# Patient Record
Sex: FEMALE | Race: WHITE | NOT HISPANIC OR LATINO | Employment: UNEMPLOYED | RURAL
[De-identification: names, ages, dates, MRNs, and addresses within clinical notes are randomized per-mention and may not be internally consistent; named-entity substitution may affect disease eponyms.]

---

## 2019-01-02 ENCOUNTER — OFFICE VISIT (OUTPATIENT)
Dept: FAMILY MEDICINE CLINIC | Facility: CLINIC | Age: 24
End: 2019-01-02

## 2019-01-02 VITALS
SYSTOLIC BLOOD PRESSURE: 120 MMHG | TEMPERATURE: 97.9 F | DIASTOLIC BLOOD PRESSURE: 80 MMHG | OXYGEN SATURATION: 98 % | BODY MASS INDEX: 28.14 KG/M2 | HEART RATE: 105 BPM | HEIGHT: 69 IN | WEIGHT: 190 LBS

## 2019-01-02 DIAGNOSIS — J06.9 UPPER RESPIRATORY TRACT INFECTION, UNSPECIFIED TYPE: ICD-10-CM

## 2019-01-02 DIAGNOSIS — Z32.01 POSITIVE URINE PREGNANCY TEST: Primary | ICD-10-CM

## 2019-01-02 DIAGNOSIS — N92.6 MISSED PERIOD: ICD-10-CM

## 2019-01-02 LAB
B-HCG UR QL: POSITIVE
INTERNAL NEGATIVE CONTROL: ABNORMAL
INTERNAL POSITIVE CONTROL: ABNORMAL
Lab: ABNORMAL

## 2019-01-02 PROCEDURE — 81025 URINE PREGNANCY TEST: CPT | Performed by: NURSE PRACTITIONER

## 2019-01-02 PROCEDURE — 99202 OFFICE O/P NEW SF 15 MIN: CPT | Performed by: NURSE PRACTITIONER

## 2019-01-02 NOTE — PROGRESS NOTES
"Chivo Soto is a 23 y.o. female.     [facility] Visit    PCP: none listed    CC: \"cold, cough, runny nose, pregnancy test\"    Reports LMP: 10-27-18.  Has had positive home pregnancy tests.  Needs pregnancy test and verification of pregnancy form to try and get insurance.       URI    This is a new problem. Episode onset: x 2 days. The problem has been unchanged. There has been no fever. Associated symptoms include congestion, coughing, headaches, nausea ( mostly in AM), rhinorrhea and sneezing. Pertinent negatives include no abdominal pain, chest pain, diarrhea, dysuria, ear pain, joint pain, joint swelling, neck pain, plugged ear sensation, rash, sinus pain, sore throat, swollen glands, vomiting or wheezing. She has tried nothing for the symptoms.        The following portions of the patient's history were reviewed and updated as appropriate: allergies, current medications, past medical history, past social history, past surgical history and problem list.    Review of Systems   Constitutional: Negative for appetite change, fatigue and fever.   HENT: Positive for congestion, rhinorrhea, sinus pressure and sneezing. Negative for ear discharge, ear pain and sore throat.    Eyes: Negative for discharge and itching.   Respiratory: Positive for cough. Negative for chest tightness, shortness of breath and wheezing.    Cardiovascular: Negative for chest pain.   Gastrointestinal: Positive for nausea ( mostly in AM). Negative for abdominal pain, diarrhea and vomiting.   Genitourinary: Negative for dysuria, vaginal bleeding and vaginal discharge.   Musculoskeletal: Negative for joint pain and neck pain.   Skin: Negative for rash.   Neurological: Positive for headache. Negative for dizziness.   Hematological: Negative for adenopathy.       Objective    /80 (BP Location: Right arm, Patient Position: Sitting, Cuff Size: Adult)   Pulse 105   Temp 97.9 °F (36.6 °C) (Tympanic)   Ht 175.3 cm (69\")  "  Wt 86.2 kg (190 lb)   SpO2 98%   BMI 28.06 kg/m²     Physical Exam   Constitutional: She is oriented to person, place, and time. She appears well-developed and well-nourished. No distress.   HENT:   Head: Normocephalic and atraumatic.   Right Ear: Ear canal normal. Tympanic membrane is not erythematous. A middle ear effusion (very small) is present.   Left Ear: Ear canal normal. Tympanic membrane is not erythematous. A middle ear effusion ( very small) is present.   Nose: Congestion ( mild) present. No rhinorrhea. Right sinus exhibits no maxillary sinus tenderness and no frontal sinus tenderness. Left sinus exhibits no maxillary sinus tenderness and no frontal sinus tenderness.   Mouth/Throat: Uvula is midline, oropharynx is clear and moist and mucous membranes are normal.   Eyes: Conjunctivae are normal. Right eye exhibits no discharge. Left eye exhibits no discharge.   Neck: Neck supple.   Cardiovascular: Normal rate and regular rhythm.   Pulmonary/Chest: Effort normal and breath sounds normal. She has no wheezes. She has no rales.   +loose cough     Lymphadenopathy:     She has no cervical adenopathy.   Neurological: She is alert and oriented to person, place, and time.   Nursing note and vitals reviewed.    Recent Results (from the past 24 hour(s))   POCT pregnancy, urine    Collection Time: 01/02/19 11:19 AM   Result Value Ref Range    HCG, Urine, QL Positive (A) Negative    Lot Number [id number]     Internal Positive Control Presumptive Positive     Internal Negative Control Presumptive Negative            Assessment/Plan   Valente was seen today for cough, nasal congestion, headache and late period.    Diagnoses and all orders for this visit:    Positive urine pregnancy test    Missed period  -     POCT pregnancy, urine    Upper respiratory tract infection, unspecified type      Patient encouraged to begin daily prenatal vitamin.  Encouraged to stay well hydrated. Eat frequent, small meals to see if it  helps with nausea.    Patient information handout on First Trimester provided.   Card for Dr. Ballard provided at patient request--she will schedule her own appt.    Verification of pregnancy letter provided.     For URI symptoms: Rest, fluids, Tylenol, Cool mist humidifier, saline spray to nose, vapor rub, otc Benadryl as needed.   Call or RTC if fever, worsening sinus pressure, purulent nasal discharge develop, will add antibiotics.

## 2019-01-02 NOTE — PATIENT INSTRUCTIONS
First Trimester of Pregnancy  The first trimester of pregnancy is from week 1 until the end of week 13 (months 1 through 3). During this time, your baby will begin to develop inside you. At 6-8 weeks, the eyes and face are formed, and the heartbeat can be seen on ultrasound. At the end of 12 weeks, all the baby's organs are formed. Prenatal care is all the medical care you receive before the birth of your baby. Make sure you get good prenatal care and follow all of your doctor's instructions.  Follow these instructions at home:  Medicines  · Take over-the-counter and prescription medicines only as told by your doctor. Some medicines are safe and some medicines are not safe during pregnancy.  · Take a prenatal vitamin that contains at least 600 micrograms (mcg) of folic acid.  · If you have trouble pooping (constipation), take medicine that will make your stool soft (stool softener) if your doctor approves.  Eating and drinking  · Eat regular, healthy meals.  · Your doctor will tell you the amount of weight gain that is right for you.  · Avoid raw meat and uncooked cheese.  · If you feel sick to your stomach (nauseous) or throw up (vomit):  ? Eat 4 or 5 small meals a day instead of 3 large meals.  ? Try eating a few soda crackers.  ? Drink liquids between meals instead of during meals.  · To prevent constipation:  ? Eat foods that are high in fiber, like fresh fruits and vegetables, whole grains, and beans.  ? Drink enough fluids to keep your pee (urine) clear or pale yellow.  Activity  · Exercise only as told by your doctor. Stop exercising if you have cramps or pain in your lower belly (abdomen) or low back.  · Do not exercise if it is too hot, too humid, or if you are in a place of great height (high altitude).  · Try to avoid standing for long periods of time. Move your legs often if you must  one place for a long time.  · Avoid heavy lifting.  · Wear low-heeled shoes. Sit and stand up straight.  · You  can have sex unless your doctor tells you not to.  Relieving pain and discomfort  · Wear a good support bra if your breasts are sore.  · Take warm water baths (sitz baths) to soothe pain or discomfort caused by hemorrhoids. Use hemorrhoid cream if your doctor says it is okay.  · Rest with your legs raised if you have leg cramps or low back pain.  · If you have puffy, bulging veins (varicose veins) in your legs:  ? Wear support hose or compression stockings as told by your doctor.  ? Raise (elevate) your feet for 15 minutes, 3-4 times a day.  ? Limit salt in your food.  Prenatal care  · Schedule your prenatal visits by the twelfth week of pregnancy.  · Write down your questions. Take them to your prenatal visits.  · Keep all your prenatal visits as told by your doctor. This is important.  Safety  · Wear your seat belt at all times when driving.  · Make a list of emergency phone numbers. The list should include numbers for family, friends, the hospital, and police and fire departments.  General instructions  · Ask your doctor for a referral to a local prenatal class. Begin classes no later than at the start of month 6 of your pregnancy.  · Ask for help if you need counseling or if you need help with nutrition. Your doctor can give you advice or tell you where to go for help.  · Do not use hot tubs, steam rooms, or saunas.  · Do not douche or use tampons or scented sanitary pads.  · Do not cross your legs for long periods of time.  · Avoid all herbs and alcohol. Avoid drugs that are not approved by your doctor.  · Do not use any tobacco products, including cigarettes, chewing tobacco, and electronic cigarettes. If you need help quitting, ask your doctor. You may get counseling or other support to help you quit.  · Avoid cat litter boxes and soil used by cats. These carry germs that can cause birth defects in the baby and can cause a loss of your baby (miscarriage) or stillbirth.  · Visit your dentist. At home, brush  your teeth with a soft toothbrush. Be gentle when you floss.  Contact a doctor if:  · You are dizzy.  · You have mild cramps or pressure in your lower belly.  · You have a nagging pain in your belly area.  · You continue to feel sick to your stomach, you throw up, or you have watery poop (diarrhea).  · You have a bad smelling fluid coming from your vagina.  · You have pain when you pee (urinate).  · You have increased puffiness (swelling) in your face, hands, legs, or ankles.  Get help right away if:  · You have a fever.  · You are leaking fluid from your vagina.  · You have spotting or bleeding from your vagina.  · You have very bad belly cramping or pain.  · You gain or lose weight rapidly.  · You throw up blood. It may look like coffee grounds.  · You are around people who have British measles, fifth disease, or chickenpox.  · You have a very bad headache.  · You have shortness of breath.  · You have any kind of trauma, such as from a fall or a car accident.  Summary  · The first trimester of pregnancy is from week 1 until the end of week 13 (months 1 through 3).  · To take care of yourself and your unborn baby, you will need to eat healthy meals, take medicines only if your doctor tells you to do so, and do activities that are safe for you and your baby.  · Keep all follow-up visits as told by your doctor. This is important as your doctor will have to ensure that your baby is healthy and growing well.  This information is not intended to replace advice given to you by your health care provider. Make sure you discuss any questions you have with your health care provider.  Document Released: 06/05/2009 Document Revised: 12/26/2017 Document Reviewed: 12/26/2017  [employer] Interactive Patient Education © 2017 [employer]

## 2019-01-30 ENCOUNTER — INITIAL PRENATAL (OUTPATIENT)
Dept: OBSTETRICS AND GYNECOLOGY | Facility: CLINIC | Age: 24
End: 2019-01-30

## 2019-01-30 ENCOUNTER — APPOINTMENT (OUTPATIENT)
Dept: LAB | Facility: HOSPITAL | Age: 24
End: 2019-01-30

## 2019-01-30 VITALS — SYSTOLIC BLOOD PRESSURE: 117 MMHG | WEIGHT: 193 LBS | DIASTOLIC BLOOD PRESSURE: 86 MMHG | BODY MASS INDEX: 28.5 KG/M2

## 2019-01-30 DIAGNOSIS — Z36.89 ENCOUNTER FOR OTHER SPECIFIED ANTENATAL SCREENING: Primary | ICD-10-CM

## 2019-01-30 DIAGNOSIS — Z86.69 HISTORY OF MIGRAINE HEADACHES: ICD-10-CM

## 2019-01-30 DIAGNOSIS — O21.9 NAUSEA AND VOMITING DURING PREGNANCY PRIOR TO 22 WEEKS GESTATION: ICD-10-CM

## 2019-01-30 DIAGNOSIS — Z3A.14 14 WEEKS GESTATION OF PREGNANCY: ICD-10-CM

## 2019-01-30 DIAGNOSIS — Z36.3 ENCOUNTER FOR ANTENATAL SCREENING FOR MALFORMATION USING ULTRASOUND: ICD-10-CM

## 2019-01-30 PROCEDURE — 80307 DRUG TEST PRSMV CHEM ANLYZR: CPT | Performed by: NURSE PRACTITIONER

## 2019-01-30 PROCEDURE — 87491 CHLMYD TRACH DNA AMP PROBE: CPT | Performed by: NURSE PRACTITIONER

## 2019-01-30 PROCEDURE — 86900 BLOOD TYPING SEROLOGIC ABO: CPT | Performed by: NURSE PRACTITIONER

## 2019-01-30 PROCEDURE — 99214 OFFICE O/P EST MOD 30 MIN: CPT | Performed by: NURSE PRACTITIONER

## 2019-01-30 PROCEDURE — 81003 URINALYSIS AUTO W/O SCOPE: CPT | Performed by: NURSE PRACTITIONER

## 2019-01-30 PROCEDURE — 86850 RBC ANTIBODY SCREEN: CPT | Performed by: NURSE PRACTITIONER

## 2019-01-30 PROCEDURE — G0123 SCREEN CERV/VAG THIN LAYER: HCPCS | Performed by: NURSE PRACTITIONER

## 2019-01-30 PROCEDURE — 85025 COMPLETE CBC W/AUTO DIFF WBC: CPT | Performed by: NURSE PRACTITIONER

## 2019-01-30 PROCEDURE — 87340 HEPATITIS B SURFACE AG IA: CPT | Performed by: NURSE PRACTITIONER

## 2019-01-30 PROCEDURE — 86803 HEPATITIS C AB TEST: CPT | Performed by: NURSE PRACTITIONER

## 2019-01-30 PROCEDURE — 87591 N.GONORRHOEAE DNA AMP PROB: CPT | Performed by: NURSE PRACTITIONER

## 2019-01-30 PROCEDURE — 87661 TRICHOMONAS VAGINALIS AMPLIF: CPT | Performed by: NURSE PRACTITIONER

## 2019-01-30 PROCEDURE — 86762 RUBELLA ANTIBODY: CPT | Performed by: NURSE PRACTITIONER

## 2019-01-30 PROCEDURE — G0432 EIA HIV-1/HIV-2 SCREEN: HCPCS | Performed by: NURSE PRACTITIONER

## 2019-01-30 PROCEDURE — 86901 BLOOD TYPING SEROLOGIC RH(D): CPT | Performed by: NURSE PRACTITIONER

## 2019-01-30 RX ORDER — PNV NO.95/FERROUS FUM/FOLIC AC 28MG-0.8MG
1 TABLET ORAL DAILY
COMMUNITY
End: 2019-07-31

## 2019-01-30 RX ORDER — ONDANSETRON 8 MG/1
8 TABLET, ORALLY DISINTEGRATING ORAL EVERY 8 HOURS PRN
Qty: 20 TABLET | Refills: 3 | Status: SHIPPED | OUTPATIENT
Start: 2019-01-30 | End: 2019-07-31

## 2019-01-30 NOTE — PROGRESS NOTES
CC: NOB visit; hx reviewed and updated.    HPI: LMP was 10/23; she was having monthly periods. She has had one other pregnancy that ended at 7w1d in 2016 from SAB of unknown cause. This is her fiance's first child; he is active duty [employer] and is currently away at this time. He'll be back home the first week of March and then will be deployed for 9 months. He is a healthy, white male. She does not work at this time and has no known medical illnesses.    ROS: Reports daily nausea and vomiting, migraines about 1-2 times per week and more discharge than usual. Denies dysuria, vb, constipation or heartburn.    P/E: see NOB exam note. Bedside vscan showed a single, viable IUP. FHTs at 160s. Pap smear and gc/ct swab obtained.    A/P: 23 y.o.  @ 14w1d by exact LMP here to initiate OB care    1. NOB visit  - Encourage PNV  - SAB precautions  - NOB labs and dating sonogram ordered  - Discussed exercise and weight gain in pregnancy, NOB packet given.  Discussed what food to avoid and medications one can take OTC.  Discussed practice and the number of partners, discussed on call physician potentially being delivering physician.   - No interest in genetic testing, will revisit in 2nd trimester  - Discussed using small meals, protein, and ginger for relief of nausea; discussed medication to alleviate symptoms, prescription provided for phenergan  - RTC in 4-5 weeks for anatomy scan & OB visit.

## 2019-01-31 LAB
ABO GROUP BLD: NORMAL
AMPHET+METHAMPHET UR QL: NEGATIVE
BARBITURATES UR QL SCN: NEGATIVE
BASOPHILS # BLD AUTO: 0.01 10*3/MM3 (ref 0–0.2)
BASOPHILS NFR BLD AUTO: 0.1 % (ref 0–2)
BENZODIAZ UR QL SCN: NEGATIVE
BILIRUB UR QL STRIP: NEGATIVE
BLD GP AB SCN SERPL QL: NEGATIVE
CANNABINOIDS SERPL QL: NEGATIVE
CLARITY UR: ABNORMAL
COCAINE UR QL: NEGATIVE
COLOR UR: YELLOW
DEPRECATED RDW RBC AUTO: 38 FL (ref 36.4–46.3)
EOSINOPHIL # BLD AUTO: 0.05 10*3/MM3 (ref 0–0.7)
EOSINOPHIL NFR BLD AUTO: 0.5 % (ref 0–7)
ERYTHROCYTE [DISTWIDTH] IN BLOOD BY AUTOMATED COUNT: 12.3 % (ref 11.5–14.5)
GLUCOSE UR STRIP-MCNC: NEGATIVE MG/DL
HCT VFR BLD AUTO: 42.3 % (ref 35–45)
HGB BLD-MCNC: 14.9 G/DL (ref 12–15.5)
HGB UR QL STRIP.AUTO: NEGATIVE
IMM GRANULOCYTES # BLD AUTO: 0.02 10*3/MM3 (ref 0–0.02)
IMM GRANULOCYTES NFR BLD AUTO: 0.2 % (ref 0–0.5)
KETONES UR QL STRIP: NEGATIVE
LEUKOCYTE ESTERASE UR QL STRIP.AUTO: NEGATIVE
LYMPHOCYTES # BLD AUTO: 1.69 10*3/MM3 (ref 0.6–4.2)
LYMPHOCYTES NFR BLD AUTO: 17.7 % (ref 10–50)
Lab: NORMAL
MCH RBC QN AUTO: 30 PG (ref 26.5–34)
MCHC RBC AUTO-ENTMCNC: 35.2 G/DL (ref 31.4–36)
MCV RBC AUTO: 85.3 FL (ref 80–98)
METHADONE UR QL SCN: NEGATIVE
MONOCYTES # BLD AUTO: 0.61 10*3/MM3 (ref 0–0.9)
MONOCYTES NFR BLD AUTO: 6.4 % (ref 0–12)
NEUTROPHILS # BLD AUTO: 7.19 10*3/MM3 (ref 2–8.6)
NEUTROPHILS NFR BLD AUTO: 75.1 % (ref 37–80)
NITRITE UR QL STRIP: NEGATIVE
OPIATES UR QL: NEGATIVE
OXYCODONE UR QL SCN: NEGATIVE
PH UR STRIP.AUTO: 6.5 [PH] (ref 5–9)
PLATELET # BLD AUTO: 212 10*3/MM3 (ref 150–450)
PMV BLD AUTO: 11.9 FL (ref 8–12)
PROT UR QL STRIP: NEGATIVE
RBC # BLD AUTO: 4.96 10*6/MM3 (ref 3.77–5.16)
RH BLD: POSITIVE
SP GR UR STRIP: 1.02 (ref 1–1.03)
UROBILINOGEN UR QL STRIP: ABNORMAL
WBC NRBC COR # BLD: 9.57 10*3/MM3 (ref 3.2–9.8)

## 2019-02-01 LAB
C TRACH RRNA CVX QL NAA+PROBE: NEGATIVE
GEN CATEG CVX/VAG CYTO-IMP: NORMAL
HBV SURFACE AG SERPL QL IA: NEGATIVE
HCV AB SER DONR QL: NEGATIVE
HIV1+2 AB SER QL: NEGATIVE
LAB AP CASE REPORT: NORMAL
LAB AP GYN ADDITIONAL INFORMATION: NORMAL
N GONORRHOEA RRNA SPEC QL NAA+PROBE: NEGATIVE
PATH INTERP SPEC-IMP: NORMAL
RUBV IGG SER QL: ABNORMAL
RUBV IGG SER-ACNC: 125 IU/ML (ref 0–9.9)
STAT OF ADQ CVX/VAG CYTO-IMP: NORMAL
TRICHOMONAS VAGINALIS PCR: NEGATIVE

## 2019-02-03 LAB — RPR SER QL: NORMAL

## 2019-03-04 ENCOUNTER — ROUTINE PRENATAL (OUTPATIENT)
Dept: OBSTETRICS AND GYNECOLOGY | Facility: CLINIC | Age: 24
End: 2019-03-04

## 2019-03-04 VITALS — WEIGHT: 200.8 LBS | DIASTOLIC BLOOD PRESSURE: 72 MMHG | BODY MASS INDEX: 29.65 KG/M2 | SYSTOLIC BLOOD PRESSURE: 126 MMHG

## 2019-03-04 DIAGNOSIS — Z86.69 HISTORY OF MIGRAINE HEADACHES: ICD-10-CM

## 2019-03-04 DIAGNOSIS — Z03.79 ENCOUNTER FOR OTHER SUSPECTED MATERNAL AND FETAL CONDITIONS RULED OUT: ICD-10-CM

## 2019-03-04 DIAGNOSIS — Z3A.18 18 WEEKS GESTATION OF PREGNANCY: Primary | ICD-10-CM

## 2019-03-04 DIAGNOSIS — O21.9 NAUSEA AND VOMITING DURING PREGNANCY PRIOR TO 22 WEEKS GESTATION: ICD-10-CM

## 2019-03-04 DIAGNOSIS — O99.212 OBESITY AFFECTING PREGNANCY IN SECOND TRIMESTER: ICD-10-CM

## 2019-03-04 PROBLEM — O99.210 OBESITY AFFECTING PREGNANCY: Status: ACTIVE | Noted: 2019-03-04

## 2019-03-04 PROBLEM — J06.9 UPPER RESPIRATORY TRACT INFECTION: Status: RESOLVED | Noted: 2019-01-02 | Resolved: 2019-03-04

## 2019-03-04 PROCEDURE — 99213 OFFICE O/P EST LOW 20 MIN: CPT | Performed by: OBSTETRICS & GYNECOLOGY

## 2019-03-05 NOTE — PROGRESS NOTES
Chief Complaint   Patient presents with   • OB Follow up   • High Risk Gestation     Valente Soto is a 23 y.o. year old .     LMP was 10/23; she was having monthly periods. She has had one other pregnancy that ended at 7w1d in  from SAB of unknown cause. This is her fiance's first child; he is active duty [employer] and is currently away at this time. He'll be back home the first week of March and then will be deployed for 9 months. He is a healthy, white male. She does not work at this time and has no known medical illnesses.    2019, ultrasound shows single intrauterine pregnancy in the breech presentation with fetal heart rate 149 bpm.  Posterior placenta with no previa.  Size consistent with dates.  Cervical length 3.8 cm.  No anomalies noted.  Some views suboptimal.  Will follow-up ultrasound in 4 weeks for suboptimal views.    The father the baby is Deandre Brown, and he is currently in the [employer].  He will return next week, both and will be deployed for approximately 9 months.    Maternal blood type O+.  Antibody screen negative.    She is having a boy named Eric Parson.    Plan delivery 2019 at 39 weeks gestation unless indicated sooner.    Obstetric History  #: 1, Date: , Sex: None, Weight: None, GA: 7w1d, Delivery: Spontaneous , Apgar1: None, Apgar5: None, Living: None, Birth Comments: SAB    #: 2, Date: None, Sex: None, Weight: None, GA: None, Delivery: None, Apgar1: None, Apgar5: None, Living: None, Birth Comments: None      The patient complains of the following: No new complaints    ROS  Vaginal bleeding: No   Nausea: No   Diarrhea: No   Constipation: No   Other:      Lab Results   Component Value Date    ABO O 2019    RH Positive 2019    ABSCRN Negative 2019       Specific topics discussed at today's visit: scheduled induction and Ultrasound results.  Tests done today: U/S for anatomic screening - anatomy was not completely seen today  Tests  to be done at the next visit: U/S to complete the anatomic screening    Valente was seen today for ob follow up and high risk gestation.    Diagnoses and all orders for this visit:    18 weeks gestation of pregnancy    Obesity affecting pregnancy in second trimester    Nausea and vomiting during pregnancy prior to 22 weeks gestation    History of migraine headaches    Encounter for other suspected maternal and fetal conditions ruled out  -     US Ob Follow Up Transabdominal Approach; Future

## 2019-04-01 ENCOUNTER — ROUTINE PRENATAL (OUTPATIENT)
Dept: OBSTETRICS AND GYNECOLOGY | Facility: CLINIC | Age: 24
End: 2019-04-01

## 2019-04-01 VITALS — DIASTOLIC BLOOD PRESSURE: 82 MMHG | BODY MASS INDEX: 31.75 KG/M2 | SYSTOLIC BLOOD PRESSURE: 131 MMHG | WEIGHT: 215 LBS

## 2019-04-01 DIAGNOSIS — Z3A.22 22 WEEKS GESTATION OF PREGNANCY: ICD-10-CM

## 2019-04-01 DIAGNOSIS — IMO0002 EVALUATE ANATOMY NOT SEEN ON PRIOR SONOGRAM: ICD-10-CM

## 2019-04-01 DIAGNOSIS — Z36.89 ENCOUNTER FOR OTHER SPECIFIED ANTENATAL SCREENING: Primary | ICD-10-CM

## 2019-04-01 PROCEDURE — 99213 OFFICE O/P EST LOW 20 MIN: CPT | Performed by: NURSE PRACTITIONER

## 2019-04-02 DIAGNOSIS — Z36.3 ENCOUNTER FOR ANTENATAL SCREENING FOR MALFORMATION USING ULTRASOUND: ICD-10-CM

## 2019-04-02 DIAGNOSIS — Z03.79 ENCOUNTER FOR OTHER SUSPECTED MATERNAL AND FETAL CONDITIONS RULED OUT: ICD-10-CM

## 2019-04-02 DIAGNOSIS — Z3A.14 14 WEEKS GESTATION OF PREGNANCY: ICD-10-CM

## 2019-04-02 DIAGNOSIS — Z86.69 HISTORY OF MIGRAINE HEADACHES: ICD-10-CM

## 2019-04-05 ENCOUNTER — HOSPITAL ENCOUNTER (EMERGENCY)
Facility: HOSPITAL | Age: 24
Discharge: HOME | End: 2019-04-05
Attending: EMERGENCY MEDICINE | Admitting: EMERGENCY MEDICINE

## 2019-04-05 ENCOUNTER — HOSPITAL ENCOUNTER (OUTPATIENT)
Facility: HOSPITAL | Age: 24
Discharge: HOME | End: 2019-04-05
Attending: OBSTETRICS & GYNECOLOGY | Admitting: OBSTETRICS & GYNECOLOGY

## 2019-04-05 ENCOUNTER — APPOINTMENT (OUTPATIENT)
Dept: ULTRASOUND IMAGING | Facility: HOSPITAL | Age: 24
End: 2019-04-05

## 2019-04-05 VITALS
TEMPERATURE: 97.9 F | OXYGEN SATURATION: 98 % | WEIGHT: 215 LBS | BODY MASS INDEX: 31.84 KG/M2 | HEART RATE: 76 BPM | DIASTOLIC BLOOD PRESSURE: 76 MMHG | SYSTOLIC BLOOD PRESSURE: 116 MMHG | RESPIRATION RATE: 18 BRPM | HEIGHT: 69 IN

## 2019-04-05 VITALS
HEART RATE: 75 BPM | WEIGHT: 215 LBS | TEMPERATURE: 98.2 F | RESPIRATION RATE: 18 BRPM | BODY MASS INDEX: 31.84 KG/M2 | HEIGHT: 69 IN | DIASTOLIC BLOOD PRESSURE: 67 MMHG | OXYGEN SATURATION: 98 % | SYSTOLIC BLOOD PRESSURE: 117 MMHG

## 2019-04-05 DIAGNOSIS — M79.605 LEFT LEG PAIN: Primary | ICD-10-CM

## 2019-04-05 PROCEDURE — 93971 EXTREMITY STUDY: CPT

## 2019-04-05 PROCEDURE — 99283 EMERGENCY DEPT VISIT LOW MDM: CPT

## 2019-04-05 PROCEDURE — G0463 HOSPITAL OUTPT CLINIC VISIT: HCPCS

## 2019-04-05 NOTE — NURSING NOTE
Dr Oquendo notified of patient c/o sweeling in left leg and back pain.   Pt states her pain is in her left hip and goes into her left leg.   assessment of patient reported to Dr Gaytan also.

## 2019-04-05 NOTE — NURSING NOTE
Spoke with KOLBY RN in ER about transfer for follow up on reports of swelling in left leg, pt assigned to room #23

## 2019-04-05 NOTE — ED PROVIDER NOTES
Subjective   23 years old  2 para 0 sent in ER from L&D for evaluation of left leg pain and swelling.  She is complaining of low back pain for almost 1 week and for the last couple of days dull aching pain in the left lower extremity with mild swelling.  It is mild to moderate intensity, intermittent, no significant aggravating or relieving factors.  Denies any fall or trauma.  OB has cleared her        History provided by:  Patient      Review of Systems   Constitutional: Negative for chills and fever.   HENT: Negative for congestion and sinus pain.    Respiratory: Negative for chest tightness and shortness of breath.    Gastrointestinal: Negative for abdominal pain.   Genitourinary: Negative for flank pain.   Musculoskeletal: Positive for back pain.   Neurological: Negative for headaches.   Psychiatric/Behavioral: Negative for agitation.       Past Medical History:   Diagnosis Date   • Migraine    • PONV (postoperative nausea and vomiting)    • Varicella     vaccine       No Known Allergies    Past Surgical History:   Procedure Laterality Date   • KNEE ARTHROSCOPY Right    • LAPAROSCOPIC CHOLECYSTECTOMY         Family History   Problem Relation Age of Onset   • No Known Problems Father    • No Known Problems Mother    • No Known Problems Brother    • No Known Problems Sister    • Breast cancer Paternal Grandfather    • No Known Problems Brother    • No Known Problems Brother    • No Known Problems Brother    • No Known Problems Sister    • Ovarian cancer Neg Hx    • Uterine cancer Neg Hx    • Colon cancer Neg Hx        Social History     Socioeconomic History   • Marital status:      Spouse name: Not on file   • Number of children: Not on file   • Years of education: Not on file   • Highest education level: Not on file   Tobacco Use   • Smoking status: Never Smoker   • Smokeless tobacco: Never Used   Substance and Sexual Activity   • Alcohol use: No     Frequency: Never   • Drug use: No   •  Sexual activity: Yes     Partners: Male     Birth control/protection: None           Objective   Physical Exam   Constitutional: She is oriented to person, place, and time. She appears well-developed and well-nourished.   HENT:   Head: Normocephalic and atraumatic.   Nose: Nose normal.   Mouth/Throat: Oropharynx is clear and moist.   Eyes: Conjunctivae are normal.   Neck: Normal range of motion. Neck supple.   Cardiovascular: Normal rate, regular rhythm and normal heart sounds.   Pulmonary/Chest: Effort normal and breath sounds normal.   Abdominal: Soft. There is no tenderness. There is no guarding.   Gravid uterus   Musculoskeletal: Normal range of motion. She exhibits no edema, tenderness or deformity.   Neurological: She is alert and oriented to person, place, and time.   Skin: Skin is warm. Capillary refill takes less than 2 seconds.   Psychiatric: She has a normal mood and affect.   Nursing note and vitals reviewed.      Procedures           ED Course                  MDM  Number of Diagnoses or Management Options  Diagnosis management comments: I did not appreciate any obvious swelling in the left lower extremity as compared to right.  Normal temperature.  No signs of cellulitis.  Ruled out DVT.  I believe is musculoskeletal pain radiation from the back.  Recommended taking Tylenol and outpatient follow-up.       Amount and/or Complexity of Data Reviewed  Tests in the radiology section of CPT®: ordered and reviewed      Labs Reviewed - No data to display    Us Venous Doppler Lower Extremity Left (duplex)    Result Date: 4/5/2019  Narrative: Ultrasound venous duplex left lower extremity HISTORY: Lower extremity pain and edema Duplex ultrasound of the deep venous system of the left lower extremity was performed Real-time images demonstrate normal compressibility without evidence of intraluminal thrombus. Doppler shows phasic flow and augmentation. Color Doppler also reveals venous patency.     Impression:  CONCLUSION: No ultrasound evidence of deep venous thrombosis of the left lower extremity. [id number] Electronically signed by:  Bradly Georges MD  4/5/2019 6:00 PM CDT Workstation: [id number]          Final diagnoses:   Left leg pain            Mac Nicole MD  04/05/19 1923

## 2019-04-06 NOTE — DISCHARGE INSTRUCTIONS
Tylenol as needed.  Follow-up with primary care and will be for reevaluation.  Return to ER for worsening.

## 2019-04-08 NOTE — PROGRESS NOTES
CC: NOB visit; hx reviewed and updated.     HPI: LMP was 10/23; she was having monthly periods. She has had one other pregnancy that ended at 7w1d in 2016 from SAB of unknown cause. This is her 's (Deandre Brown) first child; he is active duty [employer] and is currently away at this time. He'll be back home the first week of March and then will be deployed for 9 months. He is a healthy, white male. She does not work at this time and has no known medical illnesses.     ROS: Reports daily N/V and migraines have improved. Denies dysuria, vb, LOF, abnormal vaginal d/c, constipation or heartburn.     P/E:see notes    Some fetal anatomy not seen on anatomy scan @ 18w6d. F/U today showed size of fetus consistent with assigned dates. EFW- 1lb 6oz. CL3.69cm. Previous suboptimal views were obtained and appear normal at this time.     Having a boy named Eric Parson     A/P: 23 y.o.  @ 22w6d by exact LMP here to initiate OB care    1. Routine OB care  - Encourage PNV  - PTL precautions  - H/H: 14.9/42.3, PTL: 212, RI, HepBsAg neg, HepC neg, RPR-NR, gc/ct neg/neg, Uctx neg, UDS neg, pap WNL, O POSITIVE  - Declined genetic testing and carrier screening  - RTC in 4 weeks for SAMANTHA visit and 1 hour glucola

## 2019-04-29 ENCOUNTER — LAB (OUTPATIENT)
Dept: LAB | Facility: HOSPITAL | Age: 24
End: 2019-04-29

## 2019-04-29 ENCOUNTER — ROUTINE PRENATAL (OUTPATIENT)
Dept: OBSTETRICS AND GYNECOLOGY | Facility: CLINIC | Age: 24
End: 2019-04-29

## 2019-04-29 VITALS — BODY MASS INDEX: 32.62 KG/M2 | WEIGHT: 221 LBS | SYSTOLIC BLOOD PRESSURE: 115 MMHG | DIASTOLIC BLOOD PRESSURE: 82 MMHG

## 2019-04-29 DIAGNOSIS — Z3A.26 26 WEEKS GESTATION OF PREGNANCY: ICD-10-CM

## 2019-04-29 DIAGNOSIS — R12 HEARTBURN DURING PREGNANCY IN SECOND TRIMESTER: Primary | ICD-10-CM

## 2019-04-29 DIAGNOSIS — Z36.89 ENCOUNTER FOR OTHER SPECIFIED ANTENATAL SCREENING: ICD-10-CM

## 2019-04-29 DIAGNOSIS — O26.892 HEARTBURN DURING PREGNANCY IN SECOND TRIMESTER: Primary | ICD-10-CM

## 2019-04-29 LAB
DEPRECATED RDW RBC AUTO: 40.8 FL (ref 37–54)
ERYTHROCYTE [DISTWIDTH] IN BLOOD BY AUTOMATED COUNT: 12.4 % (ref 12.3–15.4)
GLUCOSE 1H P 100 G GLC PO SERPL-MCNC: 145 MG/DL
HCT VFR BLD AUTO: 38.8 % (ref 34–46.6)
HGB BLD-MCNC: 13.2 G/DL (ref 12–15.9)
MCH RBC QN AUTO: 30.9 PG (ref 26.6–33)
MCHC RBC AUTO-ENTMCNC: 34 G/DL (ref 31.5–35.7)
MCV RBC AUTO: 90.9 FL (ref 79–97)
PLATELET # BLD AUTO: 198 10*3/MM3 (ref 140–450)
PMV BLD AUTO: 12.7 FL (ref 6–12)
RBC # BLD AUTO: 4.27 10*6/MM3 (ref 3.77–5.28)
WBC NRBC COR # BLD: 9.43 10*3/MM3 (ref 3.4–10.8)

## 2019-04-29 PROCEDURE — 82950 GLUCOSE TEST: CPT

## 2019-04-29 PROCEDURE — 85027 COMPLETE CBC AUTOMATED: CPT

## 2019-04-29 PROCEDURE — 99213 OFFICE O/P EST LOW 20 MIN: CPT | Performed by: NURSE PRACTITIONER

## 2019-04-29 RX ORDER — OMEPRAZOLE 40 MG/1
40 CAPSULE, DELAYED RELEASE ORAL DAILY
Qty: 30 CAPSULE | Refills: 12 | Status: SHIPPED | OUTPATIENT
Start: 2019-04-29 | End: 2019-07-31

## 2019-05-01 DIAGNOSIS — O99.810 ABNORMAL GLUCOSE AFFECTING PREGNANCY: Primary | ICD-10-CM

## 2019-05-02 ENCOUNTER — LAB (OUTPATIENT)
Dept: LAB | Facility: HOSPITAL | Age: 24
End: 2019-05-02

## 2019-05-02 DIAGNOSIS — O99.810 ABNORMAL GLUCOSE AFFECTING PREGNANCY: Primary | ICD-10-CM

## 2019-05-02 LAB
GLUCOSE 1H P 100 G GLC PO SERPL-MCNC: 145 MG/DL (ref 74–180)
GLUCOSE 2H P 100 G GLC PO SERPL-MCNC: 102 MG/DL (ref 74–155)
GLUCOSE 3H P 100 G GLC PO SERPL-MCNC: 76 MG/DL (ref 74–140)
GLUCOSE P FAST SERPL-MCNC: 82 MG/DL (ref 74–106)

## 2019-05-02 PROCEDURE — 82952 GTT-ADDED SAMPLES: CPT

## 2019-05-02 PROCEDURE — 36415 COLL VENOUS BLD VENIPUNCTURE: CPT

## 2019-05-02 PROCEDURE — 82951 GLUCOSE TOLERANCE TEST (GTT): CPT

## 2019-05-28 ENCOUNTER — LAB (OUTPATIENT)
Dept: LAB | Facility: HOSPITAL | Age: 24
End: 2019-05-28

## 2019-05-28 ENCOUNTER — ROUTINE PRENATAL (OUTPATIENT)
Dept: OBSTETRICS AND GYNECOLOGY | Facility: CLINIC | Age: 24
End: 2019-05-28

## 2019-05-28 VITALS — SYSTOLIC BLOOD PRESSURE: 130 MMHG | BODY MASS INDEX: 34.89 KG/M2 | WEIGHT: 236.4 LBS | DIASTOLIC BLOOD PRESSURE: 70 MMHG

## 2019-05-28 DIAGNOSIS — O26.843 UTERINE SIZE DATE DISCREPANCY PREGNANCY, THIRD TRIMESTER: ICD-10-CM

## 2019-05-28 DIAGNOSIS — O12.03 EDEMA DURING PREGNANCY IN THIRD TRIMESTER: Primary | ICD-10-CM

## 2019-05-28 DIAGNOSIS — O99.212 OBESITY AFFECTING PREGNANCY IN SECOND TRIMESTER: ICD-10-CM

## 2019-05-28 DIAGNOSIS — O12.03 EDEMA DURING PREGNANCY IN THIRD TRIMESTER: ICD-10-CM

## 2019-05-28 DIAGNOSIS — E66.09 CLASS 1 OBESITY DUE TO EXCESS CALORIES WITH SERIOUS COMORBIDITY AND BODY MASS INDEX (BMI) OF 34.0 TO 34.9 IN ADULT: ICD-10-CM

## 2019-05-28 LAB
ALBUMIN SERPL-MCNC: 3.5 G/DL (ref 3.5–5.2)
ALBUMIN/GLOB SERPL: 1.3 G/DL
ALP SERPL-CCNC: 133 U/L (ref 39–117)
ALT SERPL W P-5'-P-CCNC: 11 U/L (ref 1–33)
ANION GAP SERPL CALCULATED.3IONS-SCNC: 15.2 MMOL/L
AST SERPL-CCNC: 16 U/L (ref 1–32)
BILIRUB SERPL-MCNC: 0.2 MG/DL (ref 0.2–1.2)
BUN BLD-MCNC: 5 MG/DL (ref 6–20)
BUN/CREAT SERPL: 8.8 (ref 7–25)
CALCIUM SPEC-SCNC: 8.8 MG/DL (ref 8.6–10.5)
CHLORIDE SERPL-SCNC: 106 MMOL/L (ref 98–107)
CO2 SERPL-SCNC: 16.8 MMOL/L (ref 22–29)
CREAT BLD-MCNC: 0.57 MG/DL (ref 0.57–1)
CREAT UR-MCNC: 82.4 MG/DL
DEPRECATED RDW RBC AUTO: 37.9 FL (ref 37–54)
ERYTHROCYTE [DISTWIDTH] IN BLOOD BY AUTOMATED COUNT: 11.9 % (ref 12.3–15.4)
GFR SERPL CREATININE-BSD FRML MDRD: 134 ML/MIN/1.73
GLOBULIN UR ELPH-MCNC: 2.8 GM/DL
GLUCOSE BLD-MCNC: 127 MG/DL (ref 65–99)
HCT VFR BLD AUTO: 37.9 % (ref 34–46.6)
HGB BLD-MCNC: 13 G/DL (ref 12–15.9)
MCH RBC QN AUTO: 30.2 PG (ref 26.6–33)
MCHC RBC AUTO-ENTMCNC: 34.3 G/DL (ref 31.5–35.7)
MCV RBC AUTO: 88.1 FL (ref 79–97)
PLATELET # BLD AUTO: 191 10*3/MM3 (ref 140–450)
PMV BLD AUTO: 13.4 FL (ref 6–12)
POTASSIUM BLD-SCNC: 3.7 MMOL/L (ref 3.5–5.2)
PROT SERPL-MCNC: 6.3 G/DL (ref 6–8.5)
PROT UR-MCNC: 11 MG/DL
PROT/CREAT UR: 133.5 MG/G CREA (ref 0–200)
RBC # BLD AUTO: 4.3 10*6/MM3 (ref 3.77–5.28)
SODIUM BLD-SCNC: 138 MMOL/L (ref 136–145)
WBC NRBC COR # BLD: 7.76 10*3/MM3 (ref 3.4–10.8)

## 2019-05-28 PROCEDURE — 0502F SUBSEQUENT PRENATAL CARE: CPT | Performed by: OBSTETRICS & GYNECOLOGY

## 2019-05-28 PROCEDURE — 80053 COMPREHEN METABOLIC PANEL: CPT

## 2019-05-28 PROCEDURE — 82570 ASSAY OF URINE CREATININE: CPT | Performed by: OBSTETRICS & GYNECOLOGY

## 2019-05-28 PROCEDURE — 84156 ASSAY OF PROTEIN URINE: CPT | Performed by: OBSTETRICS & GYNECOLOGY

## 2019-05-28 PROCEDURE — 36415 COLL VENOUS BLD VENIPUNCTURE: CPT

## 2019-05-28 PROCEDURE — 85027 COMPLETE CBC AUTOMATED: CPT

## 2019-05-29 ENCOUNTER — HOSPITAL ENCOUNTER (OUTPATIENT)
Facility: HOSPITAL | Age: 24
Discharge: HOME | End: 2019-05-29
Attending: OBSTETRICS & GYNECOLOGY | Admitting: OBSTETRICS & GYNECOLOGY

## 2019-05-29 ENCOUNTER — APPOINTMENT (OUTPATIENT)
Dept: ULTRASOUND IMAGING | Facility: HOSPITAL | Age: 24
End: 2019-05-29

## 2019-05-29 VITALS
BODY MASS INDEX: 35.01 KG/M2 | WEIGHT: 236.4 LBS | HEART RATE: 82 BPM | DIASTOLIC BLOOD PRESSURE: 70 MMHG | SYSTOLIC BLOOD PRESSURE: 129 MMHG | HEIGHT: 69 IN | OXYGEN SATURATION: 96 % | RESPIRATION RATE: 20 BRPM | TEMPERATURE: 98.1 F

## 2019-05-29 LAB
ALBUMIN SERPL-MCNC: 3.4 G/DL (ref 3.5–5.2)
ALBUMIN/GLOB SERPL: 1.1 G/DL
ALP SERPL-CCNC: 132 U/L (ref 39–117)
ALT SERPL W P-5'-P-CCNC: 8 U/L (ref 1–33)
ANION GAP SERPL CALCULATED.3IONS-SCNC: 14 MMOL/L
AST SERPL-CCNC: 14 U/L (ref 1–32)
BILIRUB SERPL-MCNC: 0.2 MG/DL (ref 0.2–1.2)
BILIRUB UR QL STRIP: NEGATIVE
BUN BLD-MCNC: 7 MG/DL (ref 6–20)
BUN/CREAT SERPL: 13.5 (ref 7–25)
CALCIUM SPEC-SCNC: 9.2 MG/DL (ref 8.6–10.5)
CHLORIDE SERPL-SCNC: 105 MMOL/L (ref 98–107)
CLARITY UR: CLEAR
CO2 SERPL-SCNC: 19 MMOL/L (ref 22–29)
COLOR UR: YELLOW
CREAT BLD-MCNC: 0.52 MG/DL (ref 0.57–1)
DEPRECATED RDW RBC AUTO: 35.7 FL (ref 37–54)
ERYTHROCYTE [DISTWIDTH] IN BLOOD BY AUTOMATED COUNT: 11.7 % (ref 12.3–15.4)
GFR SERPL CREATININE-BSD FRML MDRD: 149 ML/MIN/1.73
GLOBULIN UR ELPH-MCNC: 3.1 GM/DL
GLUCOSE BLD-MCNC: 86 MG/DL (ref 65–99)
GLUCOSE UR STRIP-MCNC: NEGATIVE MG/DL
HCT VFR BLD AUTO: 36.5 % (ref 34–46.6)
HGB BLD-MCNC: 12.8 G/DL (ref 12–15.9)
HGB UR QL STRIP.AUTO: NEGATIVE
KETONES UR QL STRIP: NEGATIVE
LDH SERPL-CCNC: 192 U/L (ref 135–214)
LEUKOCYTE ESTERASE UR QL STRIP.AUTO: NEGATIVE
MCH RBC QN AUTO: 29.6 PG (ref 26.6–33)
MCHC RBC AUTO-ENTMCNC: 35.1 G/DL (ref 31.5–35.7)
MCV RBC AUTO: 84.5 FL (ref 79–97)
NITRITE UR QL STRIP: NEGATIVE
PH UR STRIP.AUTO: 7 [PH] (ref 5–9)
PLATELET # BLD AUTO: 192 10*3/MM3 (ref 140–450)
PMV BLD AUTO: 12.3 FL (ref 6–12)
POTASSIUM BLD-SCNC: 3.7 MMOL/L (ref 3.5–5.2)
PROT SERPL-MCNC: 6.5 G/DL (ref 6–8.5)
PROT UR QL STRIP: NEGATIVE
RBC # BLD AUTO: 4.32 10*6/MM3 (ref 3.77–5.28)
SODIUM BLD-SCNC: 138 MMOL/L (ref 136–145)
SP GR UR STRIP: 1.01 (ref 1–1.03)
URATE SERPL-MCNC: 2.1 MG/DL (ref 2.4–5.7)
UROBILINOGEN UR QL STRIP: NORMAL
WBC NRBC COR # BLD: 10.13 10*3/MM3 (ref 3.4–10.8)
WHOLE BLOOD HOLD SPECIMEN: NORMAL

## 2019-05-29 PROCEDURE — 99214 OFFICE O/P EST MOD 30 MIN: CPT | Performed by: OBSTETRICS & GYNECOLOGY

## 2019-05-29 PROCEDURE — 85027 COMPLETE CBC AUTOMATED: CPT | Performed by: OBSTETRICS & GYNECOLOGY

## 2019-05-29 PROCEDURE — 80053 COMPREHEN METABOLIC PANEL: CPT | Performed by: OBSTETRICS & GYNECOLOGY

## 2019-05-29 PROCEDURE — 96360 HYDRATION IV INFUSION INIT: CPT

## 2019-05-29 PROCEDURE — 59025 FETAL NON-STRESS TEST: CPT | Performed by: OBSTETRICS & GYNECOLOGY

## 2019-05-29 PROCEDURE — 83615 LACTATE (LD) (LDH) ENZYME: CPT | Performed by: OBSTETRICS & GYNECOLOGY

## 2019-05-29 PROCEDURE — 76820 UMBILICAL ARTERY ECHO: CPT

## 2019-05-29 PROCEDURE — 76819 FETAL BIOPHYS PROFIL W/O NST: CPT

## 2019-05-29 PROCEDURE — 36415 COLL VENOUS BLD VENIPUNCTURE: CPT | Performed by: OBSTETRICS & GYNECOLOGY

## 2019-05-29 PROCEDURE — G0463 HOSPITAL OUTPT CLINIC VISIT: HCPCS

## 2019-05-29 PROCEDURE — 84550 ASSAY OF BLOOD/URIC ACID: CPT | Performed by: OBSTETRICS & GYNECOLOGY

## 2019-05-29 PROCEDURE — 82570 ASSAY OF URINE CREATININE: CPT | Performed by: OBSTETRICS & GYNECOLOGY

## 2019-05-29 PROCEDURE — 81003 URINALYSIS AUTO W/O SCOPE: CPT | Performed by: OBSTETRICS & GYNECOLOGY

## 2019-05-29 PROCEDURE — 59025 FETAL NON-STRESS TEST: CPT

## 2019-05-29 PROCEDURE — 84156 ASSAY OF PROTEIN URINE: CPT | Performed by: OBSTETRICS & GYNECOLOGY

## 2019-05-29 RX ORDER — SODIUM CHLORIDE, SODIUM LACTATE, POTASSIUM CHLORIDE, CALCIUM CHLORIDE 600; 310; 30; 20 MG/100ML; MG/100ML; MG/100ML; MG/100ML
INJECTION, SOLUTION INTRAVENOUS
Status: COMPLETED
Start: 2019-05-29 | End: 2019-05-29

## 2019-05-29 RX ORDER — SODIUM CHLORIDE, SODIUM LACTATE, POTASSIUM CHLORIDE, CALCIUM CHLORIDE 600; 310; 30; 20 MG/100ML; MG/100ML; MG/100ML; MG/100ML
1000 INJECTION, SOLUTION INTRAVENOUS CONTINUOUS
Status: DISCONTINUED | OUTPATIENT
Start: 2019-05-29 | End: 2019-05-29 | Stop reason: HOSPADM

## 2019-05-29 RX ADMIN — SODIUM CHLORIDE, SODIUM LACTATE, POTASSIUM CHLORIDE, CALCIUM CHLORIDE 1000 ML: 600; 310; 30; 20 INJECTION, SOLUTION INTRAVENOUS at 17:45

## 2019-05-29 RX ADMIN — SODIUM CHLORIDE, POTASSIUM CHLORIDE, SODIUM LACTATE AND CALCIUM CHLORIDE 1000 ML: 600; 310; 30; 20 INJECTION, SOLUTION INTRAVENOUS at 17:45

## 2019-05-29 RX ADMIN — SODIUM CHLORIDE, POTASSIUM CHLORIDE, SODIUM LACTATE AND CALCIUM CHLORIDE 1000 ML: 600; 310; 30; 20 INJECTION, SOLUTION INTRAVENOUS at 16:45

## 2019-05-29 RX ADMIN — SODIUM CHLORIDE, SODIUM LACTATE, POTASSIUM CHLORIDE, CALCIUM CHLORIDE 1000 ML: 600; 310; 30; 20 INJECTION, SOLUTION INTRAVENOUS at 16:45

## 2019-05-30 ENCOUNTER — ROUTINE PRENATAL (OUTPATIENT)
Dept: OBSTETRICS AND GYNECOLOGY | Facility: CLINIC | Age: 24
End: 2019-05-30

## 2019-05-30 VITALS — SYSTOLIC BLOOD PRESSURE: 120 MMHG | WEIGHT: 236.6 LBS | DIASTOLIC BLOOD PRESSURE: 74 MMHG | BODY MASS INDEX: 34.94 KG/M2

## 2019-05-30 DIAGNOSIS — O36.63X0 MACROSOMIA OF FETUS AFFECTING MANAGEMENT OF MOTHER IN THIRD TRIMESTER, SINGLE OR UNSPECIFIED FETUS: Primary | ICD-10-CM

## 2019-05-30 LAB
CREAT UR-MCNC: 32.9 MG/DL
PROT UR-MCNC: 5 MG/DL
PROT/CREAT UR: 152 MG/G CREA (ref 0–200)

## 2019-05-30 PROCEDURE — 0502F SUBSEQUENT PRENATAL CARE: CPT | Performed by: OBSTETRICS & GYNECOLOGY

## 2019-06-02 PROBLEM — E66.09 CLASS 1 OBESITY DUE TO EXCESS CALORIES WITH SERIOUS COMORBIDITY AND BODY MASS INDEX (BMI) OF 34.0 TO 34.9 IN ADULT: Status: ACTIVE | Noted: 2019-06-02

## 2019-06-02 PROBLEM — O26.843 UTERINE SIZE DATE DISCREPANCY PREGNANCY, THIRD TRIMESTER: Status: ACTIVE | Noted: 2019-06-02

## 2019-06-03 NOTE — PROGRESS NOTES
CC: SAMANTHA visit    Patient Active Problem List   Diagnosis   • History of migraine headaches   • Nausea and vomiting during pregnancy prior to 22 weeks gestation   • Obesity affecting pregnancy   • Heartburn during pregnancy in second trimester   • Edema during pregnancy in third trimester   • Uterine size date discrepancy pregnancy, third trimester   • Class 1 obesity due to excess calories with serious comorbidity and body mass index (BMI) of 34.0 to 34.9 in adult       HPI:      P/E:  See Vitals flowsheet    A/P: 23 y.o. #: 1, Date: , Sex: None, Weight: None, GA: 7w1d, Delivery: Spontaneous , Apgar1: None, Apgar5: None, Living: None, Birth Comments: SAB    #: 2, Date: None, Sex: None, Weight: None, GA: None, Delivery: None, Apgar1: None, Apgar5: None, Living: None, Birth Comments: None      1. Routine prenatal care   Encourage PNV   PTL precautions   Labs   Genetic screening   RTC  2.    Diagnosis Plan   1. Edema during pregnancy in third trimester  CBC (No Diff)    Comprehensive Metabolic Panel    Protein / Creatinine Ratio, Urine - Urine, Clean Catch   2. Uterine size date discrepancy pregnancy, third trimester  US Ob Follow Up Transabdominal Approach   3. Obesity affecting pregnancy in second trimester     4. Class 1 obesity due to excess calories with serious comorbidity and body mass index (BMI) of 34.0 to 34.9 in adult   1 hour at 145 3-hour within normal limits

## 2019-06-03 NOTE — PROGRESS NOTES
CC: SAMANTHA visit    Patient Active Problem List   Diagnosis   • History of migraine headaches   • Nausea and vomiting during pregnancy prior to 22 weeks gestation   • Obesity affecting pregnancy   • Heartburn during pregnancy in second trimester   • Edema during pregnancy in third trimester   • Uterine size date discrepancy pregnancy, third trimester   • Class 1 obesity due to excess calories with serious comorbidity and body mass index (BMI) of 34.0 to 34.9 in adult       HPI:      P/E:  See Vitals flowsheet    A/P: 23 y.o. #: 1, Date: , Sex: None, Weight: None, GA: 7w1d, Delivery: Spontaneous , Apgar1: None, Apgar5: None, Living: None, Birth Comments: SAB    #: 2, Date: None, Sex: None, Weight: None, GA: None, Delivery: None, Apgar1: None, Apgar5: None, Living: None, Birth Comments: None      1. Routine prenatal care   Encourage PNV   PTL precautions   Labs   Genetic screening   RTC  2.    Diagnosis Plan   2. Uterine size date discrepancy pregnancy, third trimester  US Ob Follow Up Transabdominal Approach   3. Obesity affecting pregnancy in second trimester

## 2019-06-06 ENCOUNTER — ROUTINE PRENATAL (OUTPATIENT)
Dept: OBSTETRICS AND GYNECOLOGY | Facility: CLINIC | Age: 24
End: 2019-06-06

## 2019-06-06 VITALS — SYSTOLIC BLOOD PRESSURE: 126 MMHG | WEIGHT: 241 LBS | DIASTOLIC BLOOD PRESSURE: 72 MMHG | BODY MASS INDEX: 35.59 KG/M2

## 2019-06-06 DIAGNOSIS — E66.09 CLASS 1 OBESITY DUE TO EXCESS CALORIES WITH SERIOUS COMORBIDITY AND BODY MASS INDEX (BMI) OF 34.0 TO 34.9 IN ADULT: ICD-10-CM

## 2019-06-06 DIAGNOSIS — Z23 NEED FOR DIPHTHERIA-TETANUS-PERTUSSIS (TDAP) VACCINE: Primary | ICD-10-CM

## 2019-06-06 DIAGNOSIS — Z3A.32 32 WEEKS GESTATION OF PREGNANCY: ICD-10-CM

## 2019-06-06 DIAGNOSIS — O12.03 EDEMA DURING PREGNANCY IN THIRD TRIMESTER: ICD-10-CM

## 2019-06-06 PROCEDURE — 90715 TDAP VACCINE 7 YRS/> IM: CPT | Performed by: OBSTETRICS & GYNECOLOGY

## 2019-06-06 PROCEDURE — 90471 IMMUNIZATION ADMIN: CPT | Performed by: OBSTETRICS & GYNECOLOGY

## 2019-06-06 PROCEDURE — 0502F SUBSEQUENT PRENATAL CARE: CPT | Performed by: OBSTETRICS & GYNECOLOGY

## 2019-06-09 PROBLEM — Z23 NEED FOR DIPHTHERIA-TETANUS-PERTUSSIS (TDAP) VACCINE: Status: ACTIVE | Noted: 2019-06-09

## 2019-06-09 PROBLEM — Z3A.32 32 WEEKS GESTATION OF PREGNANCY: Status: ACTIVE | Noted: 2019-06-09

## 2019-06-09 NOTE — PROGRESS NOTES
CC: SAMANTHA visit    Patient Active Problem List   Diagnosis   • History of migraine headaches   • Nausea and vomiting during pregnancy prior to 22 weeks gestation   • Obesity affecting pregnancy   • Heartburn during pregnancy in second trimester   • Edema during pregnancy in third trimester   • Uterine size date discrepancy pregnancy, third trimester   • Class 1 obesity due to excess calories with serious comorbidity and body mass index (BMI) of 34.0 to 34.9 in adult   • 32 weeks gestation of pregnancy   • Need for diphtheria-tetanus-pertussis (Tdap) vaccine       HPI: Follow-up prenatal care    P/E:  See Vitals flowsheet    A/P: 23 y.o. #: 1, Date: , Sex: None, Weight: None, GA: 7w1d, Delivery: Spontaneous , Apgar1: None, Apgar5: None, Living: None, Birth Comments: FRANCI    #: 2, Date: None, Sex: None, Weight: None, GA: None, Delivery: None, Apgar1: None, Apgar5: None, Living: None, Birth Comments: None      1. Routine prenatal care   Encourage PNV   PTL precautions   Labs   Genetic screening   RTC  2.    Diagnosis Plan   1. Need for diphtheria-tetanus-pertussis (Tdap) vaccine  Tdap Vaccine Greater Than or Equal To 6yo IM   2. Edema during pregnancy in third trimester   blood pressure is good no headaches visual changes epigastric pain.  Monitor blood pressure at home twice a day let us know elevations diastolic over 95 systolic over 145   3. 32 weeks gestation of pregnancy     4. Class 1 obesity due to excess calories with serious comorbidity and body mass index (BMI) of 34.0 to 34.9 in adult   BMI 35.5.  Weekly fetal well-being studies.  1 hour Glucola 145 3-hour within normal limits

## 2019-06-10 ENCOUNTER — HOSPITAL ENCOUNTER (OUTPATIENT)
Facility: HOSPITAL | Age: 24
Discharge: HOME | End: 2019-06-11
Attending: OBSTETRICS & GYNECOLOGY | Admitting: OBSTETRICS & GYNECOLOGY

## 2019-06-10 VITALS
DIASTOLIC BLOOD PRESSURE: 68 MMHG | SYSTOLIC BLOOD PRESSURE: 136 MMHG | RESPIRATION RATE: 20 BRPM | TEMPERATURE: 97.8 F | HEART RATE: 114 BPM | OXYGEN SATURATION: 96 %

## 2019-06-11 DIAGNOSIS — O26.843 UTERINE SIZE DATE DISCREPANCY PREGNANCY, THIRD TRIMESTER: ICD-10-CM

## 2019-06-11 DIAGNOSIS — O36.63X0 MACROSOMIA OF FETUS AFFECTING MANAGEMENT OF MOTHER IN THIRD TRIMESTER, SINGLE OR UNSPECIFIED FETUS: ICD-10-CM

## 2019-06-11 PROCEDURE — 59025 FETAL NON-STRESS TEST: CPT

## 2019-06-11 PROCEDURE — 59025 FETAL NON-STRESS TEST: CPT | Performed by: OBSTETRICS & GYNECOLOGY

## 2019-06-11 PROCEDURE — G0463 HOSPITAL OUTPT CLINIC VISIT: HCPCS

## 2019-06-11 NOTE — NON STRESS TEST
Valente Brown, a  at 33w0d with an IRAIDA of 2019, by Last Menstrual Period, was seen at [facility] for a nonstress test.    Chief Complaint   Patient presents with   • Abdominal Cramping       Patient Active Problem List   Diagnosis   • History of migraine headaches   • Nausea and vomiting during pregnancy prior to 22 weeks gestation   • Obesity affecting pregnancy   • Heartburn during pregnancy in second trimester   • Edema during pregnancy in third trimester   • Uterine size date discrepancy pregnancy, third trimester   • Class 1 obesity due to excess calories with serious comorbidity and body mass index (BMI) of 34.0 to 34.9 in adult   • 32 weeks gestation of pregnancy   • Need for diphtheria-tetanus-pertussis (Tdap) vaccine       Start Time: 127  Stop Time: 147    Interpretation A  Nonstress Test Interpretation A: Reactive (19 : Jennie Chin, RN)  Comments A: Strip reviewed with PEACE Cheney RN (19 : Jennie Chin, RN)

## 2019-06-13 ENCOUNTER — ROUTINE PRENATAL (OUTPATIENT)
Dept: OBSTETRICS AND GYNECOLOGY | Facility: CLINIC | Age: 24
End: 2019-06-13

## 2019-06-13 VITALS — BODY MASS INDEX: 35.59 KG/M2 | WEIGHT: 241 LBS | DIASTOLIC BLOOD PRESSURE: 80 MMHG | SYSTOLIC BLOOD PRESSURE: 118 MMHG

## 2019-06-13 DIAGNOSIS — Z3A.33 33 WEEKS GESTATION OF PREGNANCY: Primary | ICD-10-CM

## 2019-06-13 DIAGNOSIS — O40.3XX0 POLYHYDRAMNIOS IN THIRD TRIMESTER COMPLICATION, SINGLE OR UNSPECIFIED FETUS: ICD-10-CM

## 2019-06-13 DIAGNOSIS — O26.843 UTERINE SIZE DATE DISCREPANCY PREGNANCY, THIRD TRIMESTER: ICD-10-CM

## 2019-06-13 DIAGNOSIS — O36.63X0 MACROSOMIA OF FETUS AFFECTING MANAGEMENT OF MOTHER IN THIRD TRIMESTER, SINGLE OR UNSPECIFIED FETUS: ICD-10-CM

## 2019-06-13 DIAGNOSIS — O36.63X0 MACROSOMIA OF FETUS AFFECTING MANAGEMENT OF MOTHER IN THIRD TRIMESTER, SINGLE OR UNSPECIFIED FETUS: Primary | ICD-10-CM

## 2019-06-13 PROCEDURE — 0502F SUBSEQUENT PRENATAL CARE: CPT | Performed by: ADVANCED PRACTICE MIDWIFE

## 2019-06-14 DIAGNOSIS — O36.63X0 MACROSOMIA OF FETUS AFFECTING MANAGEMENT OF MOTHER IN THIRD TRIMESTER, SINGLE OR UNSPECIFIED FETUS: ICD-10-CM

## 2019-06-20 ENCOUNTER — HOSPITAL ENCOUNTER (OUTPATIENT)
Facility: HOSPITAL | Age: 24
Setting detail: OBSERVATION
Discharge: HOME | End: 2019-06-21
Attending: OBSTETRICS & GYNECOLOGY | Admitting: OBSTETRICS & GYNECOLOGY

## 2019-06-20 ENCOUNTER — ROUTINE PRENATAL (OUTPATIENT)
Dept: OBSTETRICS AND GYNECOLOGY | Facility: CLINIC | Age: 24
End: 2019-06-20

## 2019-06-20 VITALS — WEIGHT: 242.4 LBS | SYSTOLIC BLOOD PRESSURE: 140 MMHG | DIASTOLIC BLOOD PRESSURE: 70 MMHG | BODY MASS INDEX: 35.8 KG/M2

## 2019-06-20 DIAGNOSIS — O99.213 OBESITY AFFECTING PREGNANCY IN THIRD TRIMESTER: ICD-10-CM

## 2019-06-20 DIAGNOSIS — O16.9 HYPERTENSION DURING PREGNANCY, ANTEPARTUM, UNSPECIFIED HYPERTENSION IN PREGNANCY TYPE: ICD-10-CM

## 2019-06-20 DIAGNOSIS — Z3A.34 34 WEEKS GESTATION OF PREGNANCY: Primary | ICD-10-CM

## 2019-06-20 DIAGNOSIS — E66.09 CLASS 1 OBESITY DUE TO EXCESS CALORIES WITH SERIOUS COMORBIDITY AND BODY MASS INDEX (BMI) OF 34.0 TO 34.9 IN ADULT: ICD-10-CM

## 2019-06-20 DIAGNOSIS — O12.03 EDEMA DURING PREGNANCY IN THIRD TRIMESTER: ICD-10-CM

## 2019-06-20 DIAGNOSIS — Z86.69 HISTORY OF MIGRAINE HEADACHES: ICD-10-CM

## 2019-06-20 LAB
ALBUMIN SERPL-MCNC: 3.4 G/DL (ref 3.5–5.2)
ALBUMIN/GLOB SERPL: 1 G/DL
ALP SERPL-CCNC: 163 U/L (ref 39–117)
ALT SERPL W P-5'-P-CCNC: 9 U/L (ref 1–33)
ANION GAP SERPL CALCULATED.3IONS-SCNC: 14 MMOL/L
APTT PPP: 28.7 SECONDS (ref 20–40.3)
AST SERPL-CCNC: 13 U/L (ref 1–32)
BILIRUB SERPL-MCNC: 0.3 MG/DL (ref 0.2–1.2)
BUN BLD-MCNC: 5 MG/DL (ref 6–20)
BUN/CREAT SERPL: 9.1 (ref 7–25)
CALCIUM SPEC-SCNC: 9.1 MG/DL (ref 8.6–10.5)
CHLORIDE SERPL-SCNC: 104 MMOL/L (ref 98–107)
CO2 SERPL-SCNC: 22 MMOL/L (ref 22–29)
CREAT BLD-MCNC: 0.55 MG/DL (ref 0.57–1)
DEPRECATED RDW RBC AUTO: 35.8 FL (ref 37–54)
ERYTHROCYTE [DISTWIDTH] IN BLOOD BY AUTOMATED COUNT: 11.9 % (ref 12.3–15.4)
FIBRINOGEN PPP-MCNC: 522 MG/DL (ref 228–514)
GFR SERPL CREATININE-BSD FRML MDRD: 140 ML/MIN/1.73
GLOBULIN UR ELPH-MCNC: 3.3 GM/DL
GLUCOSE BLD-MCNC: 78 MG/DL (ref 65–99)
HCT VFR BLD AUTO: 37.9 % (ref 34–46.6)
HGB BLD-MCNC: 13.1 G/DL (ref 12–15.9)
HOLD SPECIMEN: NORMAL
INR PPP: 0.94 (ref 0.8–1.2)
LDH SERPL-CCNC: 187 U/L (ref 135–214)
MCH RBC QN AUTO: 29.2 PG (ref 26.6–33)
MCHC RBC AUTO-ENTMCNC: 34.6 G/DL (ref 31.5–35.7)
MCV RBC AUTO: 84.4 FL (ref 79–97)
PLATELET # BLD AUTO: 191 10*3/MM3 (ref 140–450)
PMV BLD AUTO: 12.5 FL (ref 6–12)
POTASSIUM BLD-SCNC: 3.5 MMOL/L (ref 3.5–5.2)
PROT SERPL-MCNC: 6.7 G/DL (ref 6–8.5)
PROTHROMBIN TIME: 12.4 SECONDS (ref 11.1–15.3)
RBC # BLD AUTO: 4.49 10*6/MM3 (ref 3.77–5.28)
SODIUM BLD-SCNC: 140 MMOL/L (ref 136–145)
URATE SERPL-MCNC: 2.9 MG/DL (ref 2.4–5.7)
WBC NRBC COR # BLD: 11.51 10*3/MM3 (ref 3.4–10.8)

## 2019-06-20 PROCEDURE — 99220 PR INITIAL OBSERVATION CARE/DAY 70 MINUTES: CPT | Performed by: OBSTETRICS & GYNECOLOGY

## 2019-06-20 PROCEDURE — 25010000002 BETAMETHASONE ACET & SOD PHOS PER 4 MG: Performed by: OBSTETRICS & GYNECOLOGY

## 2019-06-20 PROCEDURE — 85384 FIBRINOGEN ACTIVITY: CPT | Performed by: OBSTETRICS & GYNECOLOGY

## 2019-06-20 PROCEDURE — 85610 PROTHROMBIN TIME: CPT | Performed by: OBSTETRICS & GYNECOLOGY

## 2019-06-20 PROCEDURE — 84550 ASSAY OF BLOOD/URIC ACID: CPT | Performed by: OBSTETRICS & GYNECOLOGY

## 2019-06-20 PROCEDURE — G0379 DIRECT REFER HOSPITAL OBSERV: HCPCS

## 2019-06-20 PROCEDURE — 85027 COMPLETE CBC AUTOMATED: CPT | Performed by: OBSTETRICS & GYNECOLOGY

## 2019-06-20 PROCEDURE — G0378 HOSPITAL OBSERVATION PER HR: HCPCS

## 2019-06-20 PROCEDURE — 59025 FETAL NON-STRESS TEST: CPT

## 2019-06-20 PROCEDURE — 96372 THER/PROPH/DIAG INJ SC/IM: CPT

## 2019-06-20 PROCEDURE — 59025 FETAL NON-STRESS TEST: CPT | Performed by: OBSTETRICS & GYNECOLOGY

## 2019-06-20 PROCEDURE — 85730 THROMBOPLASTIN TIME PARTIAL: CPT | Performed by: OBSTETRICS & GYNECOLOGY

## 2019-06-20 PROCEDURE — 83615 LACTATE (LD) (LDH) ENZYME: CPT | Performed by: OBSTETRICS & GYNECOLOGY

## 2019-06-20 PROCEDURE — 80053 COMPREHEN METABOLIC PANEL: CPT | Performed by: OBSTETRICS & GYNECOLOGY

## 2019-06-20 RX ORDER — BETAMETHASONE SODIUM PHOSPHATE AND BETAMETHASONE ACETATE 3; 3 MG/ML; MG/ML
12 INJECTION, SUSPENSION INTRA-ARTICULAR; INTRALESIONAL; INTRAMUSCULAR; SOFT TISSUE EVERY 24 HOURS
Status: COMPLETED | OUTPATIENT
Start: 2019-06-20 | End: 2019-06-21

## 2019-06-20 RX ORDER — CALCIUM CARBONATE 200(500)MG
2 TABLET,CHEWABLE ORAL AS NEEDED
COMMUNITY
End: 2019-07-31

## 2019-06-20 RX ADMIN — BETAMETHASONE SODIUM PHOSPHATE AND BETAMETHASONE ACETATE 12 MG: 3; 3 INJECTION, SUSPENSION INTRA-ARTICULAR; INTRALESIONAL; INTRAMUSCULAR at 17:45

## 2019-06-21 VITALS
OXYGEN SATURATION: 99 % | RESPIRATION RATE: 18 BRPM | DIASTOLIC BLOOD PRESSURE: 62 MMHG | SYSTOLIC BLOOD PRESSURE: 128 MMHG | TEMPERATURE: 98.6 F | HEART RATE: 97 BPM

## 2019-06-21 LAB
COLLECT DURATION TIME UR: 24 HRS
PROT 24H UR-MRATE: 267 MG/24HOURS (ref 0–150)
SPECIMEN VOL 24H UR: 3000 ML

## 2019-06-21 PROCEDURE — 25010000002 BETAMETHASONE ACET & SOD PHOS PER 4 MG: Performed by: OBSTETRICS & GYNECOLOGY

## 2019-06-21 PROCEDURE — 59025 FETAL NON-STRESS TEST: CPT | Performed by: OBSTETRICS & GYNECOLOGY

## 2019-06-21 PROCEDURE — 84156 ASSAY OF PROTEIN URINE: CPT | Performed by: OBSTETRICS & GYNECOLOGY

## 2019-06-21 PROCEDURE — 99217 PR OBSERVATION CARE DISCHARGE MANAGEMENT: CPT | Performed by: OBSTETRICS & GYNECOLOGY

## 2019-06-21 PROCEDURE — 59025 FETAL NON-STRESS TEST: CPT

## 2019-06-21 PROCEDURE — G0378 HOSPITAL OBSERVATION PER HR: HCPCS

## 2019-06-21 PROCEDURE — 96372 THER/PROPH/DIAG INJ SC/IM: CPT

## 2019-06-21 PROCEDURE — 82570 ASSAY OF URINE CREATININE: CPT | Performed by: OBSTETRICS & GYNECOLOGY

## 2019-06-21 PROCEDURE — 81050 URINALYSIS VOLUME MEASURE: CPT | Performed by: OBSTETRICS & GYNECOLOGY

## 2019-06-21 RX ADMIN — BETAMETHASONE SODIUM PHOSPHATE AND BETAMETHASONE ACETATE 12 MG: 3; 3 INJECTION, SUSPENSION INTRA-ARTICULAR; INTRALESIONAL; INTRAMUSCULAR at 18:08

## 2019-06-22 ENCOUNTER — HOSPITAL ENCOUNTER (OUTPATIENT)
Facility: HOSPITAL | Age: 24
Discharge: HOME | End: 2019-06-22
Attending: OBSTETRICS & GYNECOLOGY | Admitting: OBSTETRICS & GYNECOLOGY

## 2019-06-22 VITALS
DIASTOLIC BLOOD PRESSURE: 64 MMHG | RESPIRATION RATE: 18 BRPM | TEMPERATURE: 98 F | HEART RATE: 92 BPM | OXYGEN SATURATION: 98 % | SYSTOLIC BLOOD PRESSURE: 120 MMHG

## 2019-06-22 LAB
ALBUMIN SERPL-MCNC: 3.4 G/DL (ref 3.5–5.2)
ALBUMIN/GLOB SERPL: 1.2 G/DL
ALP SERPL-CCNC: 143 U/L (ref 39–117)
ALT SERPL W P-5'-P-CCNC: 6 U/L (ref 1–33)
ANION GAP SERPL CALCULATED.3IONS-SCNC: 12 MMOL/L
AST SERPL-CCNC: 18 U/L (ref 1–32)
BILIRUB SERPL-MCNC: 0.3 MG/DL (ref 0.2–1.2)
BILIRUB UR QL STRIP: NEGATIVE
BUN BLD-MCNC: 7 MG/DL (ref 6–20)
BUN/CREAT SERPL: 13.5 (ref 7–25)
CALCIUM SPEC-SCNC: 9 MG/DL (ref 8.6–10.5)
CHLORIDE SERPL-SCNC: 110 MMOL/L (ref 98–107)
CLARITY UR: CLEAR
CO2 SERPL-SCNC: 20 MMOL/L (ref 22–29)
COLLECT DURATION TIME UR: 24 HRS
COLOR UR: YELLOW
CREAT BLD-MCNC: 0.52 MG/DL (ref 0.57–1)
CREAT UR-MCNC: 53.1 MG/DL
CREATINE 24H UR-MRATE: 1.59 G/24 HR (ref 0.7–1.6)
DEPRECATED RDW RBC AUTO: 35.9 FL (ref 37–54)
ERYTHROCYTE [DISTWIDTH] IN BLOOD BY AUTOMATED COUNT: 11.9 % (ref 12.3–15.4)
GFR SERPL CREATININE-BSD FRML MDRD: 149 ML/MIN/1.73
GLOBULIN UR ELPH-MCNC: 2.9 GM/DL
GLUCOSE BLD-MCNC: 110 MG/DL (ref 65–99)
GLUCOSE UR STRIP-MCNC: NEGATIVE MG/DL
HCT VFR BLD AUTO: 33.5 % (ref 34–46.6)
HGB BLD-MCNC: 11.6 G/DL (ref 12–15.9)
HGB UR QL STRIP.AUTO: NEGATIVE
KETONES UR QL STRIP: NEGATIVE
LEUKOCYTE ESTERASE UR QL STRIP.AUTO: NEGATIVE
MCH RBC QN AUTO: 29.1 PG (ref 26.6–33)
MCHC RBC AUTO-ENTMCNC: 34.6 G/DL (ref 31.5–35.7)
MCV RBC AUTO: 84.2 FL (ref 79–97)
NITRITE UR QL STRIP: NEGATIVE
PH UR STRIP.AUTO: 7 [PH] (ref 5–9)
PLATELET # BLD AUTO: 184 10*3/MM3 (ref 140–450)
PMV BLD AUTO: 12 FL (ref 6–12)
POTASSIUM BLD-SCNC: 3.8 MMOL/L (ref 3.5–5.2)
PROT SERPL-MCNC: 6.3 G/DL (ref 6–8.5)
PROT UR QL STRIP: NEGATIVE
RBC # BLD AUTO: 3.98 10*6/MM3 (ref 3.77–5.28)
SODIUM BLD-SCNC: 142 MMOL/L (ref 136–145)
SP GR UR STRIP: 1 (ref 1–1.03)
SPECIMEN VOL 24H UR: 3000 ML
UROBILINOGEN UR QL STRIP: NORMAL
WBC NRBC COR # BLD: 11.93 10*3/MM3 (ref 3.4–10.8)

## 2019-06-22 PROCEDURE — 85027 COMPLETE CBC AUTOMATED: CPT | Performed by: OBSTETRICS & GYNECOLOGY

## 2019-06-22 PROCEDURE — 36415 COLL VENOUS BLD VENIPUNCTURE: CPT | Performed by: OBSTETRICS & GYNECOLOGY

## 2019-06-22 PROCEDURE — 59025 FETAL NON-STRESS TEST: CPT

## 2019-06-22 PROCEDURE — 80053 COMPREHEN METABOLIC PANEL: CPT | Performed by: OBSTETRICS & GYNECOLOGY

## 2019-06-22 PROCEDURE — 59025 FETAL NON-STRESS TEST: CPT | Performed by: OBSTETRICS & GYNECOLOGY

## 2019-06-22 PROCEDURE — 81003 URINALYSIS AUTO W/O SCOPE: CPT | Performed by: OBSTETRICS & GYNECOLOGY

## 2019-06-22 PROCEDURE — G0463 HOSPITAL OUTPT CLINIC VISIT: HCPCS

## 2019-06-22 NOTE — PROGRESS NOTES
CC: SAMANTHA visit    Patient Active Problem List   Diagnosis   • History of migraine headaches   • Nausea and vomiting during pregnancy prior to 22 weeks gestation   • Obesity affecting pregnancy   • Heartburn during pregnancy in second trimester   • Edema during pregnancy in third trimester   • Uterine size date discrepancy pregnancy, third trimester   • Class 1 obesity due to excess calories with serious comorbidity and body mass index (BMI) of 34.0 to 34.9 in adult   • 32 weeks gestation of pregnancy   • Need for diphtheria-tetanus-pertussis (Tdap) vaccine   • Hypertension in pregnancy       HPI: Here for follow up prenatal care.     Labs:   No results found for: HGBA1C  Glucose   Date Value Ref Range Status   2019 78 65 - 99 mg/dL Final   2019 86 65 - 99 mg/dL Final   2019 127 (H) 65 - 99 mg/dL Final     Last Completed Pap Smear       Status Date      PAP SMEAR Done 2019 LIQUID-BASED PAP SMEAR, SCREENING          Radiology:    Each of the above were reviewed and integrated into prenatal care plan.    P/E:  See Vitals flowsheet    A/P: 23 y.o. #: 1, Date: , Sex: None, Weight: None, GA: 7w1d, Delivery: Spontaneous , Apgar1: None, Apgar5: None, Living: None, Birth Comments: SAB    #: 2, Date: None, Sex: None, Weight: None, GA: None, Delivery: None, Apgar1: None, Apgar5: None, Living: None, Birth Comments: None      1. Routine prenatal care   Encourage PNV   PTL precautions     2.    Diagnosis Plan   1. 34 weeks gestation of pregnancy     2. Class 1 obesity due to excess calories with serious comorbidity and body mass index (BMI) of 34.0 to 34.9 in adult     3. Obesity affecting pregnancy in third trimester     4. Hypertension during pregnancy, antepartum, unspecified hypertension in pregnancy type   blood pressure 140/70 no headaches visual changes or epigastric pain has been complaining of edema though findings are not terribly marked.  We are going to admit to observation bed for  work-up   5. History of migraine headaches     6. Edema during pregnancy in third trimester

## 2019-06-23 NOTE — NON STRESS TEST
Valente Brown, a  at 34w4d with an IRAIDA of 2019, by Last Menstrual Period, was seen at [facility] for a nonstress test.    Chief Complaint   Patient presents with   • Headache     was discharged last night after 24 hour urine was finished, 0400am today woke up with headache, states she hasnt felt good today, blurred vision, states she always feels hot, no history of fever, no bleeding, states baby is active       Patient Active Problem List   Diagnosis   • History of migraine headaches   • Nausea and vomiting during pregnancy prior to 22 weeks gestation   • Obesity affecting pregnancy   • Heartburn during pregnancy in second trimester   • Edema during pregnancy in third trimester   • Uterine size date discrepancy pregnancy, third trimester   • Class 1 obesity due to excess calories with serious comorbidity and body mass index (BMI) of 34.0 to 34.9 in adult   • 32 weeks gestation of pregnancy   • Need for diphtheria-tetanus-pertussis (Tdap) vaccine   • Hypertension in pregnancy       Start Time:   Stop Time:     Interpretation A  Nonstress Test Interpretation A: Reactive (19 : Candida Soto, RN)  Comments A: reviewed shara doyle rn (19 : Candida Soto, RN)

## 2019-06-23 NOTE — PROGRESS NOTES
CC: SAMANTHA visit    Patient Active Problem List   Diagnosis   • History of migraine headaches   • Nausea and vomiting during pregnancy prior to 22 weeks gestation   • Obesity affecting pregnancy   • Heartburn during pregnancy in second trimester   • Edema during pregnancy in third trimester   • Uterine size date discrepancy pregnancy, third trimester   • Class 1 obesity due to excess calories with serious comorbidity and body mass index (BMI) of 34.0 to 34.9 in adult   • 32 weeks gestation of pregnancy   • Need for diphtheria-tetanus-pertussis (Tdap) vaccine   • Hypertension in pregnancy       HPI: Here for follow up prenatal care.     Labs:   No results found for: HGBA1C  Glucose   Date Value Ref Range Status   2019 110 (H) 65 - 99 mg/dL Final   2019 78 65 - 99 mg/dL Final   2019 86 65 - 99 mg/dL Final     Last Completed Pap Smear       Status Date      PAP SMEAR Done 2019 LIQUID-BASED PAP SMEAR, SCREENING          Radiology: BPP  today    Each of the above were reviewed and integrated into prenatal care plan.    P/E:  See Vitals flowsheet    A/P: 23 y.o. #: 1, Date: , Sex: None, Weight: None, GA: 7w1d, Delivery: Spontaneous , Apgar1: None, Apgar5: None, Living: None, Birth Comments: SAB    #: 2, Date: None, Sex: None, Weight: None, GA: None, Delivery: None, Apgar1: None, Apgar5: None, Living: None, Birth Comments: None      1. Routine prenatal care   Encourage PNV   PTL precautions     2.    Diagnosis Plan   1. 33 weeks gestation of pregnancy  US Ob Follow Up Transabdominal Approach   2. Uterine size date discrepancy pregnancy, third trimester  US Ob Follow Up Transabdominal Approach   3. Macrosomia of fetus affecting management of mother in third trimester, single or unspecified fetus  US Ob Follow Up Transabdominal Approach   4. Polyhydramnios in third trimester complication, single or unspecified fetus

## 2019-06-23 NOTE — DISCHARGE INSTR - ACTIVITY
Call if any questions or concerns, someone is always available to speak with. Come to ER if bleeding, decreased fetal movement, water breaks. Take tylenol 650mg for headaches as needed, also try drinking caffine when having headache. Call office Monday for sooner follow up appt

## 2019-06-25 ENCOUNTER — ROUTINE PRENATAL (OUTPATIENT)
Dept: OBSTETRICS AND GYNECOLOGY | Facility: CLINIC | Age: 24
End: 2019-06-25

## 2019-06-25 ENCOUNTER — HOSPITAL ENCOUNTER (OUTPATIENT)
Facility: HOSPITAL | Age: 24
Setting detail: OBSERVATION
Discharge: HOME | End: 2019-06-26
Attending: OBSTETRICS & GYNECOLOGY | Admitting: OBSTETRICS & GYNECOLOGY

## 2019-06-25 ENCOUNTER — APPOINTMENT (OUTPATIENT)
Dept: ULTRASOUND IMAGING | Facility: HOSPITAL | Age: 24
End: 2019-06-25

## 2019-06-25 VITALS — DIASTOLIC BLOOD PRESSURE: 98 MMHG | WEIGHT: 243.6 LBS | BODY MASS INDEX: 35.97 KG/M2 | SYSTOLIC BLOOD PRESSURE: 130 MMHG

## 2019-06-25 DIAGNOSIS — O26.843 UTERINE SIZE DATE DISCREPANCY PREGNANCY, THIRD TRIMESTER: ICD-10-CM

## 2019-06-25 DIAGNOSIS — O13.3 GESTATIONAL HYPERTENSION WITHOUT SIGNIFICANT PROTEINURIA IN THIRD TRIMESTER: ICD-10-CM

## 2019-06-25 DIAGNOSIS — E66.09 CLASS 1 OBESITY DUE TO EXCESS CALORIES WITH SERIOUS COMORBIDITY AND BODY MASS INDEX (BMI) OF 34.0 TO 34.9 IN ADULT: ICD-10-CM

## 2019-06-25 DIAGNOSIS — O36.63X0 MACROSOMIA OF FETUS AFFECTING MANAGEMENT OF MOTHER IN THIRD TRIMESTER, SINGLE OR UNSPECIFIED FETUS: ICD-10-CM

## 2019-06-25 DIAGNOSIS — O10.013 PRE-EXISTING ESSENTIAL HYPERTENSION DURING PREGNANCY IN THIRD TRIMESTER: ICD-10-CM

## 2019-06-25 DIAGNOSIS — Z3A.33 33 WEEKS GESTATION OF PREGNANCY: ICD-10-CM

## 2019-06-25 DIAGNOSIS — Z36.85 ANTENATAL SCREENING FOR STREPTOCOCCUS B: Primary | ICD-10-CM

## 2019-06-25 DIAGNOSIS — Z3A.37 37 WEEKS GESTATION OF PREGNANCY: ICD-10-CM

## 2019-06-25 PROBLEM — O24.419 GESTATIONAL DIABETES: Status: ACTIVE | Noted: 2019-06-25

## 2019-06-25 LAB
ALBUMIN SERPL-MCNC: 3.6 G/DL (ref 3.5–5.2)
ALBUMIN/GLOB SERPL: 1.2 G/DL
ALP SERPL-CCNC: 167 U/L (ref 39–117)
ALT SERPL W P-5'-P-CCNC: 6 U/L (ref 1–33)
ANION GAP SERPL CALCULATED.3IONS-SCNC: 13 MMOL/L
AST SERPL-CCNC: 14 U/L (ref 1–32)
BASOPHILS # BLD AUTO: 0.04 10*3/MM3 (ref 0–0.2)
BASOPHILS NFR BLD AUTO: 0.3 % (ref 0–1.5)
BILIRUB SERPL-MCNC: 0.3 MG/DL (ref 0.2–1.2)
BUN BLD-MCNC: 8 MG/DL (ref 6–20)
BUN/CREAT SERPL: 12.7 (ref 7–25)
CALCIUM SPEC-SCNC: 9.2 MG/DL (ref 8.6–10.5)
CHLORIDE SERPL-SCNC: 104 MMOL/L (ref 98–107)
CO2 SERPL-SCNC: 23 MMOL/L (ref 22–29)
CREAT BLD-MCNC: 0.63 MG/DL (ref 0.57–1)
DEPRECATED RDW RBC AUTO: 36.9 FL (ref 37–54)
EOSINOPHIL # BLD AUTO: 0.1 10*3/MM3 (ref 0–0.4)
EOSINOPHIL NFR BLD AUTO: 0.8 % (ref 0.3–6.2)
ERYTHROCYTE [DISTWIDTH] IN BLOOD BY AUTOMATED COUNT: 12.1 % (ref 12.3–15.4)
GFR SERPL CREATININE-BSD FRML MDRD: 119 ML/MIN/1.73
GLOBULIN UR ELPH-MCNC: 3 GM/DL
GLUCOSE BLD-MCNC: 88 MG/DL (ref 65–99)
HCT VFR BLD AUTO: 39 % (ref 34–46.6)
HGB BLD-MCNC: 13.2 G/DL (ref 12–15.9)
IMM GRANULOCYTES # BLD AUTO: 0.17 10*3/MM3 (ref 0–0.05)
IMM GRANULOCYTES NFR BLD AUTO: 1.3 % (ref 0–0.5)
LYMPHOCYTES # BLD AUTO: 2.63 10*3/MM3 (ref 0.7–3.1)
LYMPHOCYTES NFR BLD AUTO: 20.6 % (ref 19.6–45.3)
MCH RBC QN AUTO: 29 PG (ref 26.6–33)
MCHC RBC AUTO-ENTMCNC: 33.8 G/DL (ref 31.5–35.7)
MCV RBC AUTO: 85.7 FL (ref 79–97)
MONOCYTES # BLD AUTO: 1.01 10*3/MM3 (ref 0.1–0.9)
MONOCYTES NFR BLD AUTO: 7.9 % (ref 5–12)
NEUTROPHILS # BLD AUTO: 8.8 10*3/MM3 (ref 1.7–7)
NEUTROPHILS NFR BLD AUTO: 69.1 % (ref 42.7–76)
NRBC BLD AUTO-RTO: 0 /100 WBC (ref 0–0.2)
PLATELET # BLD AUTO: 204 10*3/MM3 (ref 140–450)
PMV BLD AUTO: 12.2 FL (ref 6–12)
POTASSIUM BLD-SCNC: 4.2 MMOL/L (ref 3.5–5.2)
PROT SERPL-MCNC: 6.6 G/DL (ref 6–8.5)
RBC # BLD AUTO: 4.55 10*6/MM3 (ref 3.77–5.28)
SODIUM BLD-SCNC: 140 MMOL/L (ref 136–145)
WBC NRBC COR # BLD: 12.75 10*3/MM3 (ref 3.4–10.8)

## 2019-06-25 PROCEDURE — G0378 HOSPITAL OBSERVATION PER HR: HCPCS

## 2019-06-25 PROCEDURE — 76819 FETAL BIOPHYS PROFIL W/O NST: CPT

## 2019-06-25 PROCEDURE — 87653 STREP B DNA AMP PROBE: CPT | Performed by: OBSTETRICS & GYNECOLOGY

## 2019-06-25 PROCEDURE — 59025 FETAL NON-STRESS TEST: CPT | Performed by: OBSTETRICS & GYNECOLOGY

## 2019-06-25 PROCEDURE — 59025 FETAL NON-STRESS TEST: CPT

## 2019-06-25 PROCEDURE — G0379 DIRECT REFER HOSPITAL OBSERV: HCPCS

## 2019-06-25 PROCEDURE — 85025 COMPLETE CBC W/AUTO DIFF WBC: CPT | Performed by: OBSTETRICS & GYNECOLOGY

## 2019-06-25 PROCEDURE — 99220 PR INITIAL OBSERVATION CARE/DAY 70 MINUTES: CPT | Performed by: OBSTETRICS & GYNECOLOGY

## 2019-06-25 PROCEDURE — 80053 COMPREHEN METABOLIC PANEL: CPT | Performed by: OBSTETRICS & GYNECOLOGY

## 2019-06-26 ENCOUNTER — APPOINTMENT (OUTPATIENT)
Dept: CARDIOLOGY | Facility: HOSPITAL | Age: 24
End: 2019-06-26

## 2019-06-26 VITALS
RESPIRATION RATE: 18 BRPM | HEART RATE: 101 BPM | SYSTOLIC BLOOD PRESSURE: 126 MMHG | DIASTOLIC BLOOD PRESSURE: 67 MMHG | OXYGEN SATURATION: 98 % | TEMPERATURE: 98.4 F

## 2019-06-26 DIAGNOSIS — R93.1 ECHOCARDIOGRAM ABNORMAL: Primary | ICD-10-CM

## 2019-06-26 LAB
ALBUMIN SERPL-MCNC: 3.1 G/DL (ref 3.5–5.2)
ALBUMIN/GLOB SERPL: 1.1 G/DL
ALP SERPL-CCNC: 148 U/L (ref 39–117)
ALT SERPL W P-5'-P-CCNC: 6 U/L (ref 1–33)
ANION GAP SERPL CALCULATED.3IONS-SCNC: 12 MMOL/L
AST SERPL-CCNC: 11 U/L (ref 1–32)
BASOPHILS # BLD AUTO: 0.03 10*3/MM3 (ref 0–0.2)
BASOPHILS NFR BLD AUTO: 0.3 % (ref 0–1.5)
BH CV ECHO MEAS - ACS: 2 CM
BH CV ECHO MEAS - AO MAX PG (FULL): 5 MMHG
BH CV ECHO MEAS - AO MAX PG: 10.1 MMHG
BH CV ECHO MEAS - AO MEAN PG (FULL): 3 MMHG
BH CV ECHO MEAS - AO MEAN PG: 6 MMHG
BH CV ECHO MEAS - AO ROOT AREA (BSA CORRECTED): 1.1
BH CV ECHO MEAS - AO ROOT AREA: 4.5 CM^2
BH CV ECHO MEAS - AO ROOT DIAM: 2.4 CM
BH CV ECHO MEAS - AO V2 MAX: 159 CM/SEC
BH CV ECHO MEAS - AO V2 MEAN: 112 CM/SEC
BH CV ECHO MEAS - AO V2 VTI: 25.2 CM
BH CV ECHO MEAS - ASC AORTA: 2.5 CM
BH CV ECHO MEAS - AVA(I,A): 2.3 CM^2
BH CV ECHO MEAS - AVA(I,D): 2.3 CM^2
BH CV ECHO MEAS - AVA(V,A): 2.2 CM^2
BH CV ECHO MEAS - AVA(V,D): 2.2 CM^2
BH CV ECHO MEAS - BSA(HAYCOCK): 2.4 M^2
BH CV ECHO MEAS - BSA: 2.2 M^2
BH CV ECHO MEAS - BZI_BMI: 35.9 KILOGRAMS/M^2
BH CV ECHO MEAS - BZI_METRIC_HEIGHT: 175.3 CM
BH CV ECHO MEAS - BZI_METRIC_WEIGHT: 110.2 KG
BH CV ECHO MEAS - EDV(CUBED): 81.7 ML
BH CV ECHO MEAS - EDV(MOD-SP2): 81.2 ML
BH CV ECHO MEAS - EDV(MOD-SP4): 79.3 ML
BH CV ECHO MEAS - EDV(TEICH): 84.9 ML
BH CV ECHO MEAS - EF(CUBED): 56.4 %
BH CV ECHO MEAS - EF(MOD-BP): 49 %
BH CV ECHO MEAS - EF(MOD-SP2): 51.1 %
BH CV ECHO MEAS - EF(MOD-SP4): 49.9 %
BH CV ECHO MEAS - EF(TEICH): 48.4 %
BH CV ECHO MEAS - ESV(CUBED): 35.6 ML
BH CV ECHO MEAS - ESV(MOD-SP2): 39.7 ML
BH CV ECHO MEAS - ESV(MOD-SP4): 39.7 ML
BH CV ECHO MEAS - ESV(TEICH): 43.8 ML
BH CV ECHO MEAS - FS: 24.2 %
BH CV ECHO MEAS - IVS/LVPW: 1.1
BH CV ECHO MEAS - IVSD: 1.1 CM
BH CV ECHO MEAS - LA DIMENSION: 3.3 CM
BH CV ECHO MEAS - LA/AO: 1.4
BH CV ECHO MEAS - LV DIASTOLIC VOL/BSA (35-75): 35.3 ML/M^2
BH CV ECHO MEAS - LV MASS(C)D: 159 GRAMS
BH CV ECHO MEAS - LV MASS(C)DI: 70.8 GRAMS/M^2
BH CV ECHO MEAS - LV MAX PG: 5.1 MMHG
BH CV ECHO MEAS - LV MEAN PG: 3 MMHG
BH CV ECHO MEAS - LV SYSTOLIC VOL/BSA (12-30): 17.7 ML/M^2
BH CV ECHO MEAS - LV V1 MAX: 113 CM/SEC
BH CV ECHO MEAS - LV V1 MEAN: 79 CM/SEC
BH CV ECHO MEAS - LV V1 VTI: 18.8 CM
BH CV ECHO MEAS - LVIDD: 4.3 CM
BH CV ECHO MEAS - LVIDS: 3.3 CM
BH CV ECHO MEAS - LVLD AP2: 8.1 CM
BH CV ECHO MEAS - LVLD AP4: 7.4 CM
BH CV ECHO MEAS - LVLS AP2: 6.9 CM
BH CV ECHO MEAS - LVLS AP4: 6 CM
BH CV ECHO MEAS - LVOT AREA (M): 3.1 CM^2
BH CV ECHO MEAS - LVOT AREA: 3.1 CM^2
BH CV ECHO MEAS - LVOT DIAM: 2 CM
BH CV ECHO MEAS - LVPWD: 1 CM
BH CV ECHO MEAS - MV A MAX VEL: 70.3 CM/SEC
BH CV ECHO MEAS - MV DEC SLOPE: 684 CM/SEC^2
BH CV ECHO MEAS - MV E MAX VEL: 102 CM/SEC
BH CV ECHO MEAS - MV E/A: 1.5
BH CV ECHO MEAS - MV P1/2T MAX VEL: 102 CM/SEC
BH CV ECHO MEAS - MV P1/2T: 43.7 MSEC
BH CV ECHO MEAS - MVA P1/2T LCG: 2.2 CM^2
BH CV ECHO MEAS - MVA(P1/2T): 5 CM^2
BH CV ECHO MEAS - PA MAX PG (FULL): 1.7 MMHG
BH CV ECHO MEAS - PA MAX PG: 3.8 MMHG
BH CV ECHO MEAS - PA V2 MAX: 97.7 CM/SEC
BH CV ECHO MEAS - RAP SYSTOLE: 5 MMHG
BH CV ECHO MEAS - RV MAX PG: 2.1 MMHG
BH CV ECHO MEAS - RV MEAN PG: 1 MMHG
BH CV ECHO MEAS - RV V1 MAX: 72.4 CM/SEC
BH CV ECHO MEAS - RV V1 MEAN: 51 CM/SEC
BH CV ECHO MEAS - RV V1 VTI: 12 CM
BH CV ECHO MEAS - RVDD: 2.9 CM
BH CV ECHO MEAS - RVSP: 25.1 MMHG
BH CV ECHO MEAS - SI(AO): 50.8 ML/M^2
BH CV ECHO MEAS - SI(CUBED): 20.6 ML/M^2
BH CV ECHO MEAS - SI(LVOT): 26.3 ML/M^2
BH CV ECHO MEAS - SI(MOD-SP2): 18.5 ML/M^2
BH CV ECHO MEAS - SI(MOD-SP4): 17.6 ML/M^2
BH CV ECHO MEAS - SI(TEICH): 18.3 ML/M^2
BH CV ECHO MEAS - SV(AO): 114 ML
BH CV ECHO MEAS - SV(CUBED): 46.1 ML
BH CV ECHO MEAS - SV(LVOT): 59.1 ML
BH CV ECHO MEAS - SV(MOD-SP2): 41.5 ML
BH CV ECHO MEAS - SV(MOD-SP4): 39.6 ML
BH CV ECHO MEAS - SV(TEICH): 41.1 ML
BH CV ECHO MEAS - TR MAX VEL: 224 CM/SEC
BILIRUB SERPL-MCNC: 0.2 MG/DL (ref 0.2–1.2)
BUN BLD-MCNC: 8 MG/DL (ref 6–20)
BUN/CREAT SERPL: 15.7 (ref 7–25)
CALCIUM SPEC-SCNC: 8.8 MG/DL (ref 8.6–10.5)
CHLORIDE SERPL-SCNC: 105 MMOL/L (ref 98–107)
CO2 SERPL-SCNC: 20 MMOL/L (ref 22–29)
COLLECT DURATION TIME UR: 24 HRS
CREAT BLD-MCNC: 0.51 MG/DL (ref 0.57–1)
DEPRECATED RDW RBC AUTO: 36.5 FL (ref 37–54)
EOSINOPHIL # BLD AUTO: 0.14 10*3/MM3 (ref 0–0.4)
EOSINOPHIL NFR BLD AUTO: 1.2 % (ref 0.3–6.2)
ERYTHROCYTE [DISTWIDTH] IN BLOOD BY AUTOMATED COUNT: 12.1 % (ref 12.3–15.4)
GFR SERPL CREATININE-BSD FRML MDRD: >150 ML/MIN/1.73
GLOBULIN UR ELPH-MCNC: 2.7 GM/DL
GLUCOSE BLD-MCNC: 97 MG/DL (ref 65–99)
GROUP B STREP, DNA: NEGATIVE
HCT VFR BLD AUTO: 35.1 % (ref 34–46.6)
HGB BLD-MCNC: 12.1 G/DL (ref 12–15.9)
IMM GRANULOCYTES # BLD AUTO: 0.14 10*3/MM3 (ref 0–0.05)
IMM GRANULOCYTES NFR BLD AUTO: 1.2 % (ref 0–0.5)
LYMPHOCYTES # BLD AUTO: 2.68 10*3/MM3 (ref 0.7–3.1)
LYMPHOCYTES NFR BLD AUTO: 23.9 % (ref 19.6–45.3)
MAXIMAL PREDICTED HEART RATE: 199 BPM
MCH RBC QN AUTO: 29.2 PG (ref 26.6–33)
MCHC RBC AUTO-ENTMCNC: 34.5 G/DL (ref 31.5–35.7)
MCV RBC AUTO: 84.6 FL (ref 79–97)
MONOCYTES # BLD AUTO: 0.86 10*3/MM3 (ref 0.1–0.9)
MONOCYTES NFR BLD AUTO: 7.7 % (ref 5–12)
NEUTROPHILS # BLD AUTO: 7.36 10*3/MM3 (ref 1.7–7)
NEUTROPHILS NFR BLD AUTO: 65.7 % (ref 42.7–76)
NRBC BLD AUTO-RTO: 0 /100 WBC (ref 0–0.2)
PLATELET # BLD AUTO: 196 10*3/MM3 (ref 140–450)
PMV BLD AUTO: 13 FL (ref 6–12)
POTASSIUM BLD-SCNC: 4.2 MMOL/L (ref 3.5–5.2)
PROT 24H UR-MRATE: 255 MG/24HOURS (ref 0–150)
PROT SERPL-MCNC: 5.8 G/DL (ref 6–8.5)
RBC # BLD AUTO: 4.15 10*6/MM3 (ref 3.77–5.28)
SODIUM BLD-SCNC: 137 MMOL/L (ref 136–145)
SPECIMEN VOL 24H UR: 3000 ML
STRESS TARGET HR: 169 BPM
WBC NRBC COR # BLD: 11.21 10*3/MM3 (ref 3.4–10.8)

## 2019-06-26 PROCEDURE — G0378 HOSPITAL OBSERVATION PER HR: HCPCS

## 2019-06-26 PROCEDURE — 84156 ASSAY OF PROTEIN URINE: CPT | Performed by: OBSTETRICS & GYNECOLOGY

## 2019-06-26 PROCEDURE — 93010 ELECTROCARDIOGRAM REPORT: CPT | Performed by: INTERNAL MEDICINE

## 2019-06-26 PROCEDURE — 99217 PR OBSERVATION CARE DISCHARGE MANAGEMENT: CPT | Performed by: OBSTETRICS & GYNECOLOGY

## 2019-06-26 PROCEDURE — 93306 TTE W/DOPPLER COMPLETE: CPT

## 2019-06-26 PROCEDURE — 81050 URINALYSIS VOLUME MEASURE: CPT | Performed by: OBSTETRICS & GYNECOLOGY

## 2019-06-26 PROCEDURE — 85025 COMPLETE CBC W/AUTO DIFF WBC: CPT | Performed by: OBSTETRICS & GYNECOLOGY

## 2019-06-26 PROCEDURE — 80053 COMPREHEN METABOLIC PANEL: CPT | Performed by: OBSTETRICS & GYNECOLOGY

## 2019-06-26 PROCEDURE — 93005 ELECTROCARDIOGRAM TRACING: CPT | Performed by: OBSTETRICS & GYNECOLOGY

## 2019-06-26 PROCEDURE — 93306 TTE W/DOPPLER COMPLETE: CPT | Performed by: INTERNAL MEDICINE

## 2019-06-26 NOTE — PROGRESS NOTES
CC: SAMANTHA visit    Patient Active Problem List   Diagnosis   • History of migraine headaches   • Nausea and vomiting during pregnancy prior to 22 weeks gestation   • Obesity affecting pregnancy   • Heartburn during pregnancy in second trimester   • Edema during pregnancy in third trimester   • Uterine size date discrepancy pregnancy, third trimester   • Class 1 obesity due to excess calories with serious comorbidity and body mass index (BMI) of 34.0 to 34.9 in adult   • 32 weeks gestation of pregnancy   • Need for diphtheria-tetanus-pertussis (Tdap) vaccine   • Hypertension in pregnancy   • Gestational diabetes   •  screening for streptococcus B   • 37 weeks gestation of pregnancy   • Gestational hypertension without significant proteinuria in third trimester       HPI: Here for follow up prenatal care.     Labs:   No results found for: HGBA1C  Glucose   Date Value Ref Range Status   2019 88 65 - 99 mg/dL Final   2019 110 (H) 65 - 99 mg/dL Final   2019 78 65 - 99 mg/dL Final     Last Completed Pap Smear       Status Date      PAP SMEAR Done 2019 LIQUID-BASED PAP SMEAR, SCREENING          Radiology:    Each of the above were reviewed and integrated into prenatal care plan.    P/E:  See Vitals flowsheet    A/P: 23 y.o. #: 1, Date: , Sex: None, Weight: None, GA: 7w1d, Delivery: Spontaneous , Apgar1: None, Apgar5: None, Living: None, Birth Comments: FRANCI    #: 2, Date: None, Sex: None, Weight: None, GA: None, Delivery: None, Apgar1: None, Apgar5: None, Living: None, Birth Comments: None      1. Routine prenatal care   Encourage PNV   PTL precautions     2.    Diagnosis Plan   1.  screening for streptococcus B  Group B Strep (Molecular) - Swab, Vaginal/Rectum    Group B Strep (Molecular) - Swab, Vaginal/Rectum   2. 37 weeks gestation of pregnancy     3. Class 1 obesity due to excess calories with serious comorbidity and body mass index (BMI) of 34.0 to 34.9 in adult      4. Pre-existing essential hypertension during pregnancy in third trimester     5. Gestational hypertension without significant proteinuria in third trimester   second elevated blood pressure making gestational hypertension diagnosis will admit for further blood pressures to evaluate for severe preeclampsia and obtain 24-hour urine

## 2019-06-27 DIAGNOSIS — O36.63X0 MACROSOMIA OF FETUS AFFECTING MANAGEMENT OF MOTHER IN THIRD TRIMESTER, SINGLE OR UNSPECIFIED FETUS: Primary | ICD-10-CM

## 2019-06-28 ENCOUNTER — OFFICE VISIT (OUTPATIENT)
Dept: CARDIOLOGY | Facility: CLINIC | Age: 24
End: 2019-06-28

## 2019-06-28 ENCOUNTER — ROUTINE PRENATAL (OUTPATIENT)
Dept: OBSTETRICS AND GYNECOLOGY | Facility: CLINIC | Age: 24
End: 2019-06-28

## 2019-06-28 ENCOUNTER — LAB (OUTPATIENT)
Dept: LAB | Facility: HOSPITAL | Age: 24
End: 2019-06-28

## 2019-06-28 VITALS
BODY MASS INDEX: 35.73 KG/M2 | DIASTOLIC BLOOD PRESSURE: 80 MMHG | WEIGHT: 241.2 LBS | OXYGEN SATURATION: 99 % | HEIGHT: 69 IN | HEART RATE: 114 BPM | SYSTOLIC BLOOD PRESSURE: 130 MMHG

## 2019-06-28 VITALS — WEIGHT: 241 LBS | BODY MASS INDEX: 35.59 KG/M2 | SYSTOLIC BLOOD PRESSURE: 138 MMHG | DIASTOLIC BLOOD PRESSURE: 92 MMHG

## 2019-06-28 DIAGNOSIS — O99.213 OBESITY AFFECTING PREGNANCY IN THIRD TRIMESTER: ICD-10-CM

## 2019-06-28 DIAGNOSIS — R93.1 ABNORMAL ECHOCARDIOGRAM: Primary | ICD-10-CM

## 2019-06-28 DIAGNOSIS — O10.013 PRE-EXISTING ESSENTIAL HYPERTENSION DURING PREGNANCY IN THIRD TRIMESTER: ICD-10-CM

## 2019-06-28 DIAGNOSIS — O12.03 EDEMA DURING PREGNANCY IN THIRD TRIMESTER: ICD-10-CM

## 2019-06-28 DIAGNOSIS — Z86.69 HISTORY OF MIGRAINE HEADACHES: ICD-10-CM

## 2019-06-28 DIAGNOSIS — O13.3 GESTATIONAL HYPERTENSION WITHOUT SIGNIFICANT PROTEINURIA IN THIRD TRIMESTER: ICD-10-CM

## 2019-06-28 DIAGNOSIS — O13.3 GESTATIONAL HYPERTENSION WITHOUT SIGNIFICANT PROTEINURIA IN THIRD TRIMESTER: Primary | ICD-10-CM

## 2019-06-28 DIAGNOSIS — R00.0 TACHYCARDIA: Primary | ICD-10-CM

## 2019-06-28 DIAGNOSIS — E66.09 CLASS 1 OBESITY DUE TO EXCESS CALORIES WITH SERIOUS COMORBIDITY AND BODY MASS INDEX (BMI) OF 34.0 TO 34.9 IN ADULT: ICD-10-CM

## 2019-06-28 PROCEDURE — 84156 ASSAY OF PROTEIN URINE: CPT | Performed by: OBSTETRICS & GYNECOLOGY

## 2019-06-28 PROCEDURE — 36415 COLL VENOUS BLD VENIPUNCTURE: CPT

## 2019-06-28 PROCEDURE — 80053 COMPREHEN METABOLIC PANEL: CPT

## 2019-06-28 PROCEDURE — 93000 ELECTROCARDIOGRAM COMPLETE: CPT | Performed by: INTERNAL MEDICINE

## 2019-06-28 PROCEDURE — 0502F SUBSEQUENT PRENATAL CARE: CPT | Performed by: OBSTETRICS & GYNECOLOGY

## 2019-06-28 PROCEDURE — 99204 OFFICE O/P NEW MOD 45 MIN: CPT | Performed by: INTERNAL MEDICINE

## 2019-06-28 PROCEDURE — 82570 ASSAY OF URINE CREATININE: CPT | Performed by: OBSTETRICS & GYNECOLOGY

## 2019-06-28 PROCEDURE — 85027 COMPLETE CBC AUTOMATED: CPT

## 2019-06-28 NOTE — PROGRESS NOTES
Cardiovascular Medicine      Simeon Luther M.D., Ph.D., [facility]         Michele Cornejo MD  [address] [place] [address]    Thank you for asking me to see Valente Brown for abnormal echo.    History of Present Illness  This is a 23 y.o. female with:    1. LV EF: 49%  2. IUP with HTN    Valente Brown is a 23 y.o. female who presents for consultation today.  She is referred from obstetrics gynecology for an abnormal echocardiogram.  She currently has and IUP.  She was found to have elevated blood pressures concerning for preeclampsia versus gestational diabetes.  She was actually recently admitted to exclude preeclampsia.  During her work-up a 2D TTE was ordered.  This showed a LVEF of 49%.  Obstetrics was concerned that this was a developing PPCM, so the patient was asked to follow-up with cardiology for further evaluation and management.  Patient denies a history of MI, CHF or CAD.  Patient had normal blood pressures prior to the development of her pregnancy.  Patient does not have a history of diabetes.  She is not currently taking antihypertensive medications.  She has had no exertional dyspnea.  She denies resting, exertional or nocturnal angina.  No lower extremity edema, PND or orthopnea.  No dizziness, lightheadedness or syncope. This is her second IUP. Her first child was a SAB around seven weeks. She is planning on a vaginal delivery. Her expected date is 7/30/2019.     Review of Systems - Review of Systems   Cardiovascular: Negative.    Respiratory: Negative.         All other systems were reviewed and were negative.    family history includes Breast cancer in her paternal grandfather; No Known Problems in her brother, brother, brother, brother, father, mother, sister, and sister.     reports that she has never smoked. She has never used smokeless tobacco. She reports that she does not drink alcohol or use drugs.    No Known Allergies      Current Outpatient  "Medications:   •  calcium carbonate (TUMS) 500 MG chewable tablet, Chew 2 tablets As Needed for Indigestion or Heartburn., Disp: , Rfl:   •  omeprazole (PRILOSEC) 40 MG capsule, Take 1 capsule by mouth Daily., Disp: 30 capsule, Rfl: 12  •  ondansetron ODT (ZOFRAN ODT) 8 MG disintegrating tablet, Take 1 tablet by mouth Every 8 (Eight) Hours As Needed for Nausea or Vomiting., Disp: 20 tablet, Rfl: 3  •  Prenatal Vit-Fe Fumarate-FA (PRENATAL VITAMIN) 27-0.8 MG tablet, Take 1 tablet by mouth Daily., Disp: , Rfl:     Physical Exam:  Vitals:    06/28/19 0914   BP: 130/80   BP Location: Left arm   Patient Position: Sitting   Cuff Size: Adult   Pulse: 114   SpO2: 99%   Weight: 109 kg (241 lb 3.2 oz)   Height: 175.3 cm (69\")   PainSc: 0-No pain     Pulse Ox: Normal  on room air  General: alert, appears stated age and cooperative     Body Habitus: well-nourished    HEENT: Head: Normocephalic, no lesions, without obvious abnormality. No arcus senilis, xanthelasma or xanthomas.    Neuro: alert, oriented x3  speech normal in context and clarity  memory intact grossly  Pulses: 2+ and symmetric  JVP: Volume/Pulsation: Normal.  Normal waveforms.   Appropriate inspiratory decrease.  No Kussmaul's. No Ynes's.   Carotid Exam: no bruit normal pulsation bilaterally   Carotid Volume: normal.     Subclavian Bruit: absent  Vertebral Bruit: absent  Respirations: no increased work of breathing   Chest:  Normal    Pulmonary:Normal     Precordium: Normal impulses. P2 is not palpable.  RV Heave: absent  LV Heave: absent  [place]:  normal size and placement  Palpable S4: absent.  Heart rate: tachycardic    Heart Rhythm: regular     Heart Sounds: S1: normal intensity  S2: increased intensity  S3: absent   S4: absent  Opening Snap: absent    A2-OS:  no  Pericardial Rub:  Absent   Ejection click: None      Murmurs:  absent   Extremity: moves all extremities equally.   DATA REVIEWED:     EKG: Sinus " tach  ---------------------------------------------------  TTE/VY:  Results for orders placed during the hospital encounter of 06/25/19   Adult Transthoracic Echo Complete W/ Cont if Necessary Per Protocol    Narrative · Left ventricular systolic function is mildly decreased. Calculated EF =   49%. Diastolic function is normal.  · Borderline dilated right ventricle with normal right ventricular   systolic function.  · No significant valvular abnormalities.  · No pericardial effusion.               Lab Results   Component Value Date    GLUCOSE 97 06/26/2019    BUN 8 06/26/2019    CREATININE 0.51 (L) 06/26/2019    EGFRIFNONA >150 06/26/2019    BCR 15.7 06/26/2019    K 4.2 06/26/2019    CO2 20.0 (L) 06/26/2019    CALCIUM 8.8 06/26/2019    ALBUMIN 3.10 (L) 06/26/2019    AST 11 06/26/2019    ALT 6 06/26/2019     Lab Results   Component Value Date    WBC 11.21 (H) 06/26/2019    HGB 12.1 06/26/2019    HCT 35.1 06/26/2019    MCV 84.6 06/26/2019     06/26/2019     No results found for: CHOL, CHLPL, TRIG, HDL, LDL, LDLDIRECT  No results found for: TSH, Z1NOSEG, X2SGEKY, THYROIDAB  No results found for: CKTOTAL, CKMB, CKMBINDEX, TROPONINI, TROPONINT  No results found for: HGBA1C  No results found for: DDIMER  Lab Results   Component Value Date    ALT 6 06/26/2019     No results found for: HGBA1C  Lab Results   Component Value Date    CREATININE 0.51 (L) 06/26/2019     No results found for: IRON, TIBC, FERRITIN  Lab Results   Component Value Date    INR 0.94 06/20/2019    PROTIME 12.4 06/20/2019       Assessment/Plan     1. Abnormal echocardiogram with LV EF of 49%.  She does not currently meet criteria for PPCM.with that said, I certainly cannot exclude the development of PPCM.we do have some research that shows that gestational hypertension can accelerate PPCM.I think it would be worthy to keep a very close eye on her left ventricular ejection fraction through the remainder of her pregnancy and post-delivery.I did  "discuss with her that we do not have robust data regarding the prevention   PPCM.  I am concerned that her LVEF is mildly diminished.  We discussed BB.  I discussed with her the risks and benefits of BB in pregnancy. We discussed IUGR, which really should not be much of an issue at this point in her pregnancy.  Fetal bradycardia has been reported with the use of Bisoprolol, as well as hypoglycemia. I think at this point it would be reasonable to forego medications and repeat a 2D TTE in 1 week.    2. Cardiac Risk Assessment:Dyslipidemia/BP Goals//Diet/Current Weight/Tobacco status/Screening:   -Continue follow-up visits with PCP/OB  for monitoring of labs; Dietary changes: Increase soluble fiber: A printed copy of a low fat, low cholesterol diet was given; Reduce saturated fat, \"trans\" monounsaturated fatty acids, and cholesterol; Exercise changes:  Encouraged daily aerobic activity.  -HTN is a significant risk factor for stroke, heart disease and vascular disease. I've recommended the patient continue current medications, if any, as prescribed by the primary care provider. I recommended they have close follow-up for ongoing mgmt of this and the medical comorbidities associated with HTN with their PCP.  -HTN hand-out    General patient education regarding weight:   The patient's BMI is Body mass index is 35.62 kg/m²..  This places the patient in weight class:  Obese Class II: 35-39.9kg/m2.   -Weight loss hand-out  -Exercise intervention:   Hand out, increase aerobic activity  -Close PCP follow-up for Body mass index is 35.62 kg/m²..     Nicotine Status:   Nicotine status: has never smoked             Return in about 1 week (around 7/5/2019).      "

## 2019-06-29 LAB
ALBUMIN SERPL-MCNC: 3.6 G/DL (ref 3.5–5.2)
ALBUMIN/GLOB SERPL: 1.3 G/DL
ALP SERPL-CCNC: 176 U/L (ref 39–117)
ALT SERPL W P-5'-P-CCNC: 5 U/L (ref 1–33)
ANION GAP SERPL CALCULATED.3IONS-SCNC: 12.3 MMOL/L (ref 5–15)
AST SERPL-CCNC: 11 U/L (ref 1–32)
BILIRUB SERPL-MCNC: 0.2 MG/DL (ref 0.2–1.2)
BUN BLD-MCNC: 8 MG/DL (ref 6–20)
BUN/CREAT SERPL: 15.7 (ref 7–25)
CALCIUM SPEC-SCNC: 9.4 MG/DL (ref 8.6–10.5)
CHLORIDE SERPL-SCNC: 110 MMOL/L (ref 98–107)
CO2 SERPL-SCNC: 17.7 MMOL/L (ref 22–29)
CREAT BLD-MCNC: 0.51 MG/DL (ref 0.57–1)
CREAT UR-MCNC: 95.1 MG/DL
DEPRECATED RDW RBC AUTO: 35.7 FL (ref 37–54)
ERYTHROCYTE [DISTWIDTH] IN BLOOD BY AUTOMATED COUNT: 11.8 % (ref 12.3–15.4)
GFR SERPL CREATININE-BSD FRML MDRD: >150 ML/MIN/1.73
GLOBULIN UR ELPH-MCNC: 2.8 GM/DL
GLUCOSE BLD-MCNC: 91 MG/DL (ref 65–99)
HCT VFR BLD AUTO: 39.2 % (ref 34–46.6)
HGB BLD-MCNC: 13.5 G/DL (ref 12–15.9)
MCH RBC QN AUTO: 29.2 PG (ref 26.6–33)
MCHC RBC AUTO-ENTMCNC: 34.4 G/DL (ref 31.5–35.7)
MCV RBC AUTO: 84.7 FL (ref 79–97)
PLATELET # BLD AUTO: 204 10*3/MM3 (ref 140–450)
PMV BLD AUTO: 13.6 FL (ref 6–12)
POTASSIUM BLD-SCNC: 4.3 MMOL/L (ref 3.5–5.2)
PROT SERPL-MCNC: 6.4 G/DL (ref 6–8.5)
PROT UR-MCNC: 12 MG/DL
PROT/CREAT UR: 126.2 MG/G CREA (ref 0–200)
RBC # BLD AUTO: 4.63 10*6/MM3 (ref 3.77–5.28)
SODIUM BLD-SCNC: 140 MMOL/L (ref 136–145)
WBC NRBC COR # BLD: 9.03 10*3/MM3 (ref 3.4–10.8)

## 2019-06-29 NOTE — PROGRESS NOTES
CC: SAMANTHA visit    Patient Active Problem List   Diagnosis   • History of migraine headaches   • Nausea and vomiting during pregnancy prior to 22 weeks gestation   • Heartburn during pregnancy in second trimester   • Edema during pregnancy in third trimester   • Uterine size date discrepancy pregnancy, third trimester   • Class 1 obesity due to excess calories with serious comorbidity and body mass index (BMI) of 34.0 to 34.9 in adult   • 32 weeks gestation of pregnancy   • Need for diphtheria-tetanus-pertussis (Tdap) vaccine   • Gestational diabetes   •  screening for streptococcus B   • 37 weeks gestation of pregnancy   • Gestational hypertension without significant proteinuria in third trimester   • Abnormal echocardiogram       HPI: Here for follow up prenatal care.     Labs:   No results found for: HGBA1C  Glucose   Date Value Ref Range Status   2019 91 65 - 99 mg/dL Final   2019 97 65 - 99 mg/dL Final   2019 88 65 - 99 mg/dL Final     Last Completed Pap Smear       Status Date      PAP SMEAR Done 2019 LIQUID-BASED PAP SMEAR, SCREENING          Radiology:    Each of the above were reviewed and integrated into prenatal care plan.    P/E:  See Vitals flowsheet    A/P: 23 y.o. #: 1, Date: , Sex: None, Weight: None, GA: 7w1d, Delivery: Spontaneous , Apgar1: None, Apgar5: None, Living: None, Birth Comments: SAB    #: 2, Date: None, Sex: None, Weight: None, GA: None, Delivery: None, Apgar1: None, Apgar5: None, Living: None, Birth Comments: None      1. Routine prenatal care   Encourage PNV   PTL precautions     2.    Diagnosis Plan   1. Gestational hypertension without significant proteinuria in third trimester  CBC (No Diff)    Comprehensive Metabolic Panel    Protein / Creatinine Ratio, Urine - Urine, Clean Catch    US Fetal Biophysical Profile;Without Non-Stress Testing no headaches visual changes epigastric pain DTRs 2+/4.  Biophysical profile  taking blood pressure at  home.  Did not bring in readings but says they are less than 140 systolic and 90 diastolic.  We will get labs today   2. Class 1 obesity due to excess calories with serious comorbidity and body mass index (BMI) of 34.0 to 34.9 in adult     3. Edema during pregnancy in third trimester     4. History of migraine headaches

## 2019-07-01 ENCOUNTER — LAB (OUTPATIENT)
Dept: LAB | Facility: HOSPITAL | Age: 24
End: 2019-07-01

## 2019-07-01 ENCOUNTER — ROUTINE PRENATAL (OUTPATIENT)
Dept: OBSTETRICS AND GYNECOLOGY | Facility: CLINIC | Age: 24
End: 2019-07-01

## 2019-07-01 VITALS — SYSTOLIC BLOOD PRESSURE: 130 MMHG | BODY MASS INDEX: 36.09 KG/M2 | DIASTOLIC BLOOD PRESSURE: 82 MMHG | WEIGHT: 244.4 LBS

## 2019-07-01 DIAGNOSIS — O12.03 EDEMA DURING PREGNANCY IN THIRD TRIMESTER: ICD-10-CM

## 2019-07-01 DIAGNOSIS — O13.3 GESTATIONAL HYPERTENSION WITHOUT SIGNIFICANT PROTEINURIA IN THIRD TRIMESTER: ICD-10-CM

## 2019-07-01 DIAGNOSIS — O13.3 GESTATIONAL HYPERTENSION WITHOUT SIGNIFICANT PROTEINURIA IN THIRD TRIMESTER: Primary | ICD-10-CM

## 2019-07-01 DIAGNOSIS — Z3A.36 36 WEEKS GESTATION OF PREGNANCY: ICD-10-CM

## 2019-07-01 DIAGNOSIS — E66.09 CLASS 1 OBESITY DUE TO EXCESS CALORIES WITH SERIOUS COMORBIDITY AND BODY MASS INDEX (BMI) OF 34.0 TO 34.9 IN ADULT: ICD-10-CM

## 2019-07-01 LAB
ALBUMIN SERPL-MCNC: 3.7 G/DL (ref 3.5–5.2)
ALBUMIN/GLOB SERPL: 1.2 G/DL
ALP SERPL-CCNC: 179 U/L (ref 39–117)
ALT SERPL W P-5'-P-CCNC: 7 U/L (ref 1–33)
ANION GAP SERPL CALCULATED.3IONS-SCNC: 11.5 MMOL/L (ref 5–15)
AST SERPL-CCNC: 13 U/L (ref 1–32)
BILIRUB SERPL-MCNC: 0.2 MG/DL (ref 0.2–1.2)
BUN BLD-MCNC: 6 MG/DL (ref 6–20)
BUN/CREAT SERPL: 10.7 (ref 7–25)
CALCIUM SPEC-SCNC: 9.5 MG/DL (ref 8.6–10.5)
CHLORIDE SERPL-SCNC: 106 MMOL/L (ref 98–107)
CO2 SERPL-SCNC: 18.5 MMOL/L (ref 22–29)
CREAT BLD-MCNC: 0.56 MG/DL (ref 0.57–1)
CREAT UR-MCNC: 90.4 MG/DL
DEPRECATED RDW RBC AUTO: 37.7 FL (ref 37–54)
ERYTHROCYTE [DISTWIDTH] IN BLOOD BY AUTOMATED COUNT: 12.1 % (ref 12.3–15.4)
GFR SERPL CREATININE-BSD FRML MDRD: 137 ML/MIN/1.73
GLOBULIN UR ELPH-MCNC: 3 GM/DL
GLUCOSE BLD-MCNC: 118 MG/DL (ref 65–99)
HCT VFR BLD AUTO: 38.8 % (ref 34–46.6)
HGB BLD-MCNC: 13.1 G/DL (ref 12–15.9)
MCH RBC QN AUTO: 29.1 PG (ref 26.6–33)
MCHC RBC AUTO-ENTMCNC: 33.8 G/DL (ref 31.5–35.7)
MCV RBC AUTO: 86.2 FL (ref 79–97)
PLATELET # BLD AUTO: 186 10*3/MM3 (ref 140–450)
PMV BLD AUTO: 13.2 FL (ref 6–12)
POTASSIUM BLD-SCNC: 3.9 MMOL/L (ref 3.5–5.2)
PROT SERPL-MCNC: 6.7 G/DL (ref 6–8.5)
PROT UR-MCNC: 12 MG/DL
PROT/CREAT UR: 132.7 MG/G CREA (ref 0–200)
RBC # BLD AUTO: 4.5 10*6/MM3 (ref 3.77–5.28)
SODIUM BLD-SCNC: 136 MMOL/L (ref 136–145)
WBC NRBC COR # BLD: 9.7 10*3/MM3 (ref 3.4–10.8)

## 2019-07-01 PROCEDURE — 85027 COMPLETE CBC AUTOMATED: CPT

## 2019-07-01 PROCEDURE — 82570 ASSAY OF URINE CREATININE: CPT | Performed by: OBSTETRICS & GYNECOLOGY

## 2019-07-01 PROCEDURE — 84156 ASSAY OF PROTEIN URINE: CPT | Performed by: OBSTETRICS & GYNECOLOGY

## 2019-07-01 PROCEDURE — 80053 COMPREHEN METABOLIC PANEL: CPT

## 2019-07-01 PROCEDURE — 0502F SUBSEQUENT PRENATAL CARE: CPT | Performed by: OBSTETRICS & GYNECOLOGY

## 2019-07-01 PROCEDURE — 36415 COLL VENOUS BLD VENIPUNCTURE: CPT

## 2019-07-03 ENCOUNTER — DOCUMENTATION (OUTPATIENT)
Dept: OBSTETRICS AND GYNECOLOGY | Facility: CLINIC | Age: 24
End: 2019-07-03

## 2019-07-03 DIAGNOSIS — O36.63X0 ENCOUNTER FOR MATERNAL CARE FOR EXCESSIVE FETAL GROWTH IN THIRD TRIMESTER, SINGLE OR UNSPECIFIED FETUS: ICD-10-CM

## 2019-07-03 DIAGNOSIS — O13.3 GESTATIONAL HYPERTENSION WITHOUT SIGNIFICANT PROTEINURIA IN THIRD TRIMESTER: ICD-10-CM

## 2019-07-03 DIAGNOSIS — O99.213 OBESITY COMPLICATING PREGNANCY IN THIRD TRIMESTER: ICD-10-CM

## 2019-07-03 DIAGNOSIS — Z3A.34 34 WEEKS GESTATION OF PREGNANCY: Primary | ICD-10-CM

## 2019-07-03 NOTE — PROGRESS NOTES
[facility] called today asking for information regarding .  Patient gave me verbal permission to give them any information required to help get her  home for delivery.

## 2019-07-05 ENCOUNTER — OFFICE VISIT (OUTPATIENT)
Dept: CARDIOLOGY | Facility: CLINIC | Age: 24
End: 2019-07-05

## 2019-07-05 ENCOUNTER — HOSPITAL ENCOUNTER (INPATIENT)
Facility: HOSPITAL | Age: 24
LOS: 5 days | Discharge: HOME | End: 2019-07-10
Attending: OBSTETRICS & GYNECOLOGY | Admitting: OBSTETRICS & GYNECOLOGY

## 2019-07-05 ENCOUNTER — ROUTINE PRENATAL (OUTPATIENT)
Dept: OBSTETRICS AND GYNECOLOGY | Facility: CLINIC | Age: 24
End: 2019-07-05

## 2019-07-05 VITALS
HEART RATE: 104 BPM | WEIGHT: 243.9 LBS | SYSTOLIC BLOOD PRESSURE: 108 MMHG | BODY MASS INDEX: 36.12 KG/M2 | HEIGHT: 69 IN | OXYGEN SATURATION: 99 % | DIASTOLIC BLOOD PRESSURE: 82 MMHG

## 2019-07-05 VITALS — DIASTOLIC BLOOD PRESSURE: 76 MMHG | SYSTOLIC BLOOD PRESSURE: 101 MMHG | WEIGHT: 248 LBS | BODY MASS INDEX: 36.62 KG/M2

## 2019-07-05 DIAGNOSIS — Z3A.36 36 WEEKS GESTATION OF PREGNANCY: ICD-10-CM

## 2019-07-05 DIAGNOSIS — O28.8 NST (NON-STRESS TEST) WITH DECELERATIONS: ICD-10-CM

## 2019-07-05 DIAGNOSIS — O36.63X0 ENCOUNTER FOR MATERNAL CARE FOR EXCESSIVE FETAL GROWTH IN THIRD TRIMESTER, SINGLE OR UNSPECIFIED FETUS: ICD-10-CM

## 2019-07-05 DIAGNOSIS — Z3A.34 34 WEEKS GESTATION OF PREGNANCY: ICD-10-CM

## 2019-07-05 DIAGNOSIS — R93.1 ABNORMAL ECHOCARDIOGRAM: Primary | ICD-10-CM

## 2019-07-05 DIAGNOSIS — O13.3 GESTATIONAL HYPERTENSION WITHOUT SIGNIFICANT PROTEINURIA IN THIRD TRIMESTER: ICD-10-CM

## 2019-07-05 DIAGNOSIS — O16.9 HYPERTENSION DURING PREGNANCY, ANTEPARTUM, UNSPECIFIED HYPERTENSION IN PREGNANCY TYPE: Primary | ICD-10-CM

## 2019-07-05 DIAGNOSIS — O61.0 FAILED MEDICAL INDUCTION OF LABOR: Primary | ICD-10-CM

## 2019-07-05 DIAGNOSIS — O99.213 OBESITY COMPLICATING PREGNANCY IN THIRD TRIMESTER: ICD-10-CM

## 2019-07-05 PROBLEM — O24.419 GESTATIONAL DIABETES: Status: RESOLVED | Noted: 2019-06-25 | Resolved: 2019-07-05

## 2019-07-05 PROBLEM — Z34.90 PREGNANCY: Status: ACTIVE | Noted: 2019-07-05

## 2019-07-05 PROBLEM — Z3A.32 32 WEEKS GESTATION OF PREGNANCY: Status: RESOLVED | Noted: 2019-06-09 | Resolved: 2019-07-05

## 2019-07-05 PROBLEM — Z3A.37 37 WEEKS GESTATION OF PREGNANCY: Status: RESOLVED | Noted: 2019-06-25 | Resolved: 2019-07-05

## 2019-07-05 LAB
ABO GROUP BLD: NORMAL
ALBUMIN SERPL-MCNC: 3.3 G/DL (ref 3.5–5.2)
ALBUMIN/GLOB SERPL: 1.1 G/DL
ALP SERPL-CCNC: 176 U/L (ref 39–117)
ALT SERPL W P-5'-P-CCNC: 9 U/L (ref 1–33)
AMPHET+METHAMPHET UR QL: NEGATIVE
ANION GAP SERPL CALCULATED.3IONS-SCNC: 12 MMOL/L (ref 5–15)
AST SERPL-CCNC: 14 U/L (ref 1–32)
BARBITURATES UR QL SCN: NEGATIVE
BASOPHILS # BLD AUTO: 0.03 10*3/MM3 (ref 0–0.2)
BASOPHILS NFR BLD AUTO: 0.3 % (ref 0–1.5)
BENZODIAZ UR QL SCN: NEGATIVE
BH CV ECHO MEAS - ACS: 2 CM
BH CV ECHO MEAS - AO ROOT AREA (BSA CORRECTED): 1.2
BH CV ECHO MEAS - AO ROOT AREA: 5.7 CM^2
BH CV ECHO MEAS - AO ROOT DIAM: 2.7 CM
BH CV ECHO MEAS - BSA(HAYCOCK): 2.4 M^2
BH CV ECHO MEAS - BSA: 2.2 M^2
BH CV ECHO MEAS - BZI_BMI: 36 KILOGRAMS/M^2
BH CV ECHO MEAS - BZI_METRIC_HEIGHT: 175.3 CM
BH CV ECHO MEAS - BZI_METRIC_WEIGHT: 110.7 KG
BH CV ECHO MEAS - EDV(CUBED): 85.2 ML
BH CV ECHO MEAS - EDV(MOD-SP2): 83 ML
BH CV ECHO MEAS - EDV(MOD-SP4): 90 ML
BH CV ECHO MEAS - EDV(TEICH): 87.7 ML
BH CV ECHO MEAS - EF(CUBED): 61.5 %
BH CV ECHO MEAS - EF(MOD-SP2): 53 %
BH CV ECHO MEAS - EF(MOD-SP4): 54.4 %
BH CV ECHO MEAS - EF(TEICH): 53.3 %
BH CV ECHO MEAS - ESV(CUBED): 32.8 ML
BH CV ECHO MEAS - ESV(MOD-SP2): 39 ML
BH CV ECHO MEAS - ESV(MOD-SP4): 41 ML
BH CV ECHO MEAS - ESV(TEICH): 41 ML
BH CV ECHO MEAS - FS: 27.3 %
BH CV ECHO MEAS - IVS/LVPW: 1.1
BH CV ECHO MEAS - IVSD: 1 CM
BH CV ECHO MEAS - LV DIASTOLIC VOL/BSA (35-75): 40 ML/M^2
BH CV ECHO MEAS - LV MASS(C)D: 137.8 GRAMS
BH CV ECHO MEAS - LV MASS(C)DI: 61.3 GRAMS/M^2
BH CV ECHO MEAS - LV SYSTOLIC VOL/BSA (12-30): 18.2 ML/M^2
BH CV ECHO MEAS - LVIDD: 4.4 CM
BH CV ECHO MEAS - LVIDS: 3.2 CM
BH CV ECHO MEAS - LVLD AP2: 8 CM
BH CV ECHO MEAS - LVLD AP4: 7.6 CM
BH CV ECHO MEAS - LVLS AP2: 6.9 CM
BH CV ECHO MEAS - LVLS AP4: 7 CM
BH CV ECHO MEAS - LVPWD: 0.9 CM
BH CV ECHO MEAS - MV A MAX VEL: 59 CM/SEC
BH CV ECHO MEAS - MV DEC SLOPE: 369 CM/SEC^2
BH CV ECHO MEAS - MV E MAX VEL: 78.7 CM/SEC
BH CV ECHO MEAS - MV E/A: 1.3
BH CV ECHO MEAS - MV P1/2T MAX VEL: 90.2 CM/SEC
BH CV ECHO MEAS - MV P1/2T: 71.6 MSEC
BH CV ECHO MEAS - MVA P1/2T LCG: 2.4 CM^2
BH CV ECHO MEAS - MVA(P1/2T): 3.1 CM^2
BH CV ECHO MEAS - RVDD: 2.9 CM
BH CV ECHO MEAS - SI(CUBED): 23.3 ML/M^2
BH CV ECHO MEAS - SI(MOD-SP2): 19.6 ML/M^2
BH CV ECHO MEAS - SI(MOD-SP4): 21.8 ML/M^2
BH CV ECHO MEAS - SI(TEICH): 20.8 ML/M^2
BH CV ECHO MEAS - SV(CUBED): 52.4 ML
BH CV ECHO MEAS - SV(MOD-SP2): 44 ML
BH CV ECHO MEAS - SV(MOD-SP4): 49 ML
BH CV ECHO MEAS - SV(TEICH): 46.7 ML
BH CV ECHO MEAS - TR MAX VEL: 201 CM/SEC
BILIRUB SERPL-MCNC: 0.2 MG/DL (ref 0.2–1.2)
BLD GP AB SCN SERPL QL: NEGATIVE
BUN BLD-MCNC: 6 MG/DL (ref 6–20)
BUN/CREAT SERPL: 9.7 (ref 7–25)
CALCIUM SPEC-SCNC: 8.8 MG/DL (ref 8.6–10.5)
CANNABINOIDS SERPL QL: NEGATIVE
CHLORIDE SERPL-SCNC: 104 MMOL/L (ref 98–107)
CO2 SERPL-SCNC: 22 MMOL/L (ref 22–29)
COCAINE UR QL: NEGATIVE
CREAT BLD-MCNC: 0.62 MG/DL (ref 0.57–1)
DEPRECATED RDW RBC AUTO: 36 FL (ref 37–54)
DEPRECATED RDW RBC AUTO: 36.2 FL (ref 37–54)
EOSINOPHIL # BLD AUTO: 0.15 10*3/MM3 (ref 0–0.4)
EOSINOPHIL NFR BLD AUTO: 1.5 % (ref 0.3–6.2)
ERYTHROCYTE [DISTWIDTH] IN BLOOD BY AUTOMATED COUNT: 12 % (ref 12.3–15.4)
ERYTHROCYTE [DISTWIDTH] IN BLOOD BY AUTOMATED COUNT: 12 % (ref 12.3–15.4)
GFR SERPL CREATININE-BSD FRML MDRD: 122 ML/MIN/1.73
GLOBULIN UR ELPH-MCNC: 2.9 GM/DL
GLUCOSE BLD-MCNC: 93 MG/DL (ref 65–99)
HCT VFR BLD AUTO: 35.1 % (ref 34–46.6)
HCT VFR BLD AUTO: 37.4 % (ref 34–46.6)
HGB BLD-MCNC: 12 G/DL (ref 12–15.9)
HGB BLD-MCNC: 13 G/DL (ref 12–15.9)
IMM GRANULOCYTES # BLD AUTO: 0.06 10*3/MM3 (ref 0–0.05)
IMM GRANULOCYTES NFR BLD AUTO: 0.6 % (ref 0–0.5)
LV EF 2D ECHO EST: 50 %
LYMPHOCYTES # BLD AUTO: 1.92 10*3/MM3 (ref 0.7–3.1)
LYMPHOCYTES NFR BLD AUTO: 19.6 % (ref 19.6–45.3)
Lab: NORMAL
MCH RBC QN AUTO: 28.7 PG (ref 26.6–33)
MCH RBC QN AUTO: 29 PG (ref 26.6–33)
MCHC RBC AUTO-ENTMCNC: 34.2 G/DL (ref 31.5–35.7)
MCHC RBC AUTO-ENTMCNC: 34.8 G/DL (ref 31.5–35.7)
MCV RBC AUTO: 83.3 FL (ref 79–97)
MCV RBC AUTO: 84 FL (ref 79–97)
METHADONE UR QL SCN: NEGATIVE
MONOCYTES # BLD AUTO: 0.85 10*3/MM3 (ref 0.1–0.9)
MONOCYTES NFR BLD AUTO: 8.7 % (ref 5–12)
NEUTROPHILS # BLD AUTO: 6.77 10*3/MM3 (ref 1.7–7)
NEUTROPHILS NFR BLD AUTO: 69.3 % (ref 42.7–76)
NRBC BLD AUTO-RTO: 0 /100 WBC (ref 0–0.2)
OPIATES UR QL: NEGATIVE
OXYCODONE UR QL SCN: NEGATIVE
PLATELET # BLD AUTO: 169 10*3/MM3 (ref 140–450)
PLATELET # BLD AUTO: 172 10*3/MM3 (ref 140–450)
PMV BLD AUTO: 12.7 FL (ref 6–12)
PMV BLD AUTO: 12.9 FL (ref 6–12)
POTASSIUM BLD-SCNC: 3.6 MMOL/L (ref 3.5–5.2)
PROT SERPL-MCNC: 6.2 G/DL (ref 6–8.5)
RBC # BLD AUTO: 4.18 10*6/MM3 (ref 3.77–5.28)
RBC # BLD AUTO: 4.49 10*6/MM3 (ref 3.77–5.28)
RH BLD: POSITIVE
SODIUM BLD-SCNC: 138 MMOL/L (ref 136–145)
T&S EXPIRATION DATE: NORMAL
WBC NRBC COR # BLD: 9.77 10*3/MM3 (ref 3.4–10.8)
WBC NRBC COR # BLD: 9.78 10*3/MM3 (ref 3.4–10.8)

## 2019-07-05 PROCEDURE — 99223 1ST HOSP IP/OBS HIGH 75: CPT | Performed by: OBSTETRICS & GYNECOLOGY

## 2019-07-05 PROCEDURE — 80307 DRUG TEST PRSMV CHEM ANLYZR: CPT | Performed by: OBSTETRICS & GYNECOLOGY

## 2019-07-05 PROCEDURE — 86901 BLOOD TYPING SEROLOGIC RH(D): CPT | Performed by: OBSTETRICS & GYNECOLOGY

## 2019-07-05 PROCEDURE — 86900 BLOOD TYPING SEROLOGIC ABO: CPT | Performed by: OBSTETRICS & GYNECOLOGY

## 2019-07-05 PROCEDURE — 86850 RBC ANTIBODY SCREEN: CPT | Performed by: OBSTETRICS & GYNECOLOGY

## 2019-07-05 PROCEDURE — 3E0P7VZ INTRODUCTION OF HORMONE INTO FEMALE REPRODUCTIVE, VIA NATURAL OR ARTIFICIAL OPENING: ICD-10-PCS | Performed by: OBSTETRICS & GYNECOLOGY

## 2019-07-05 PROCEDURE — 25010000003 PENICILLIN G POTASSIUM PER 600000 UNITS: Performed by: OBSTETRICS & GYNECOLOGY

## 2019-07-05 PROCEDURE — 85025 COMPLETE CBC W/AUTO DIFF WBC: CPT | Performed by: OBSTETRICS & GYNECOLOGY

## 2019-07-05 PROCEDURE — 85027 COMPLETE CBC AUTOMATED: CPT | Performed by: OBSTETRICS & GYNECOLOGY

## 2019-07-05 PROCEDURE — 99214 OFFICE O/P EST MOD 30 MIN: CPT | Performed by: INTERNAL MEDICINE

## 2019-07-05 PROCEDURE — 36415 COLL VENOUS BLD VENIPUNCTURE: CPT | Performed by: OBSTETRICS & GYNECOLOGY

## 2019-07-05 PROCEDURE — 80053 COMPREHEN METABOLIC PANEL: CPT | Performed by: OBSTETRICS & GYNECOLOGY

## 2019-07-05 RX ORDER — SODIUM CHLORIDE, SODIUM LACTATE, POTASSIUM CHLORIDE, CALCIUM CHLORIDE 600; 310; 30; 20 MG/100ML; MG/100ML; MG/100ML; MG/100ML
125 INJECTION, SOLUTION INTRAVENOUS CONTINUOUS
Status: DISCONTINUED | OUTPATIENT
Start: 2019-07-05 | End: 2019-07-07

## 2019-07-05 RX ORDER — SODIUM CHLORIDE 0.9 % (FLUSH) 0.9 %
3 SYRINGE (ML) INJECTION EVERY 12 HOURS SCHEDULED
Status: DISCONTINUED | OUTPATIENT
Start: 2019-07-05 | End: 2019-07-07

## 2019-07-05 RX ORDER — SODIUM CHLORIDE 0.9 % (FLUSH) 0.9 %
5 SYRINGE (ML) INJECTION AS NEEDED
Status: DISCONTINUED | OUTPATIENT
Start: 2019-07-05 | End: 2019-07-10 | Stop reason: HOSPADM

## 2019-07-05 RX ORDER — MISOPROSTOL 100 MCG
TABLET ORAL
Status: COMPLETED
Start: 2019-07-05 | End: 2019-07-05

## 2019-07-05 RX ORDER — SODIUM CHLORIDE 9 MG/ML
INJECTION, SOLUTION INTRAVENOUS
Status: DISPENSED
Start: 2019-07-05 | End: 2019-07-06

## 2019-07-05 RX ORDER — SODIUM CHLORIDE, SODIUM LACTATE, POTASSIUM CHLORIDE, CALCIUM CHLORIDE 600; 310; 30; 20 MG/100ML; MG/100ML; MG/100ML; MG/100ML
125 INJECTION, SOLUTION INTRAVENOUS CONTINUOUS
Status: DISCONTINUED | OUTPATIENT
Start: 2019-07-05 | End: 2019-07-09

## 2019-07-05 RX ORDER — MISOPROSTOL 100 MCG
25 TABLET ORAL ONCE
Status: COMPLETED | OUTPATIENT
Start: 2019-07-05 | End: 2019-07-05

## 2019-07-05 RX ORDER — MISOPROSTOL 100 MCG
25 TABLET ORAL ONCE
Status: DISCONTINUED | OUTPATIENT
Start: 2019-07-05 | End: 2019-07-07

## 2019-07-05 RX ADMIN — MISOPROSTOL 25 MCG: 100 TABLET ORAL at 18:03

## 2019-07-05 RX ADMIN — SODIUM CHLORIDE, POTASSIUM CHLORIDE, SODIUM LACTATE AND CALCIUM CHLORIDE 125 ML/HR: 600; 310; 30; 20 INJECTION, SOLUTION INTRAVENOUS at 17:59

## 2019-07-05 RX ADMIN — MISOPROSTOL 25 MCG: 100 TABLET ORAL at 21:25

## 2019-07-05 RX ADMIN — SODIUM CHLORIDE 5 MILLION UNITS: 9 INJECTION, SOLUTION INTRAVENOUS at 21:34

## 2019-07-05 RX ADMIN — Medication 25 MCG: at 18:03

## 2019-07-05 NOTE — PROGRESS NOTES
Cardiovascular Medicine      Simeon Luther M.D., Ph.D., [facility]           History of Present Illness  This is a 23 y.o. female with:    1. LV EF: 49%  2. IUP with HTN    Valente Brown is a 23 y.o. female who presents in follow-up today.  She currently has an IUP.  She was found to have elevated blood pressures concerning for preeclampsia versus gestational diabetes.  She was actually recently admitted to exclude preeclampsia.  During her work-up a 2D TTE was ordered.  This showed a LVEF of 49%.  Obstetrics was concerned that this was a developing PPCM, so the patient was asked to follow-up with cardiology for further evaluation and management. This is her second IUP. Her first child was a SAB around seven weeks. She is planning on a vaginal delivery. Her expected date is 7/30/2019.  When I last saw her in consultation she was euvolemic and perfused.  No evidence of proteinuria.  She likely has developed gestational hypertension.  I went over the diagnostic criteria for PPCM, which she did not currently meet.  I could not exclude that she might be developing PPCM, so I asked her to have a close follow-up 2D echocardiogram.  This was performed prior to her appointment today.  She presents for results.  She continues to remain asymptomatic from a cardiovascular standpoint.    Review of Systems - Review of Systems   Cardiovascular: Negative.    Respiratory: Negative.         All other systems were reviewed and were negative.    family history includes Breast cancer in her paternal grandfather; No Known Problems in her brother, brother, brother, brother, father, mother, sister, and sister.     reports that she has never smoked. She has never used smokeless tobacco. She reports that she does not drink alcohol or use drugs.    No Known Allergies      Current Outpatient Medications:   •  calcium carbonate (TUMS) 500 MG chewable tablet, Chew 2 tablets As Needed for Indigestion or Heartburn., Disp: , Rfl:   •   "omeprazole (PRILOSEC) 40 MG capsule, Take 1 capsule by mouth Daily., Disp: 30 capsule, Rfl: 12  •  ondansetron ODT (ZOFRAN ODT) 8 MG disintegrating tablet, Take 1 tablet by mouth Every 8 (Eight) Hours As Needed for Nausea or Vomiting., Disp: 20 tablet, Rfl: 3  •  Prenatal Vit-Fe Fumarate-FA (PRENATAL VITAMIN) 27-0.8 MG tablet, Take 1 tablet by mouth Daily., Disp: , Rfl:     Physical Exam:  Vitals:    07/05/19 0833   BP: 108/82   BP Location: Left arm   Patient Position: Sitting   Cuff Size: Adult   Pulse: 104   SpO2: 99%   Weight: 111 kg (243 lb 14.4 oz)   Height: 175.3 cm (69\")   PainSc: 0-No pain     Pulse Ox: Normal  on room air  General: alert, appears stated age and cooperative     Body Habitus: well-nourished    HEENT: Head: Normocephalic, no lesions, without obvious abnormality. No arcus senilis, xanthelasma or xanthomas.    JVP: Volume/Pulsation: Normal.  Normal waveforms.   Appropriate inspiratory decrease.  No Kussmaul's. No Ynes's.      Precordium: Normal impulses. P2 is not palpable.  RV Heave: absent  LV Heave: absent  [place]:  normal size and placement  Palpable S4: absent.  Heart rate: tachycardic    Heart Rhythm: regular     Heart Sounds: S1: normal intensity  S2: increased intensity  S3: absent   S4: absent  Opening Snap: absent    A2-OS:  no  Pericardial Rub:  Absent   Ejection click: None      Murmurs:  absent   Extremity: moves all extremities equally.   DATA REVIEWED:     TTE/VY:  Results for orders placed during the hospital encounter of 06/25/19   Adult Transthoracic Echo Complete W/ Cont if Necessary Per Protocol    Narrative · Left ventricular systolic function is mildly decreased. Calculated EF =   49%. Diastolic function is normal.  · Borderline dilated right ventricle with normal right ventricular   systolic function.  · No significant valvular abnormalities.  · No pericardial effusion.               Lab Results   Component Value Date    GLUCOSE 118 (H) 07/01/2019    BUN 6 07/01/2019 " "   CREATININE 0.56 (L) 07/01/2019    EGFRIFNONA 137 07/01/2019    BCR 10.7 07/01/2019    K 3.9 07/01/2019    CO2 18.5 (L) 07/01/2019    CALCIUM 9.5 07/01/2019    ALBUMIN 3.70 07/01/2019    AST 13 07/01/2019    ALT 7 07/01/2019     Lab Results   Component Value Date    WBC 9.70 07/01/2019    HGB 13.1 07/01/2019    HCT 38.8 07/01/2019    MCV 86.2 07/01/2019     07/01/2019     No results found for: CHOL, CHLPL, TRIG, HDL, LDL, LDLDIRECT  No results found for: TSH, S9KAMEL, X3NRKAH, THYROIDAB  No results found for: CKTOTAL, CKMB, CKMBINDEX, TROPONINI, TROPONINT  No results found for: HGBA1C  No results found for: DDIMER  Lab Results   Component Value Date    ALT 7 07/01/2019     No results found for: HGBA1C  Lab Results   Component Value Date    CREATININE 0.56 (L) 07/01/2019     No results found for: IRON, TIBC, FERRITIN  Lab Results   Component Value Date    INR 0.94 06/20/2019    PROTIME 12.4 06/20/2019       Assessment/Plan     1. Abnormal echocardiogram with LV EF of 49%.  Her TTE shows LVEF ~50%. Chamber dimensions are normal. I would like to have her deliver and then recheck post-delivery. If she has sustained low-normal LV EF she can return to the care of her PCP.  -TTE PTNA   2. Cardiac Risk Assessment:Dyslipidemia/BP Goals//Diet/Current Weight/Tobacco status/Screening:   -Continue follow-up visits with PCP/OB  for monitoring of labs; Dietary changes: Increase soluble fiber: A printed copy of a low fat, low cholesterol diet was given; Reduce saturated fat, \"trans\" monounsaturated fatty acids, and cholesterol; Exercise changes:  Encouraged daily aerobic activity.  -HTN is a significant risk factor for stroke, heart disease and vascular disease. I've recommended the patient continue current medications, if any, as prescribed by the primary care provider. I recommended they have close follow-up for ongoing mgmt of this and the medical comorbidities associated with HTN with their PCP.  -HTN " hand-out    General patient education regarding weight:   The patient's BMI is Body mass index is 36.02 kg/m²..  This places the patient in weight class:  Obese Class II: 35-39.9kg/m2.   -Weight loss hand-out  -Exercise intervention:   Hand out, increase aerobic activity  -Close PCP follow-up for Body mass index is 36.02 kg/m²..     Nicotine Status:   Nicotine status: has never smoked             No Follow-up on file.

## 2019-07-06 PROCEDURE — 10907ZC DRAINAGE OF AMNIOTIC FLUID, THERAPEUTIC FROM PRODUCTS OF CONCEPTION, VIA NATURAL OR ARTIFICIAL OPENING: ICD-10-PCS | Performed by: OBSTETRICS & GYNECOLOGY

## 2019-07-06 PROCEDURE — 3E033VJ INTRODUCTION OF OTHER HORMONE INTO PERIPHERAL VEIN, PERCUTANEOUS APPROACH: ICD-10-PCS | Performed by: OBSTETRICS & GYNECOLOGY

## 2019-07-06 PROCEDURE — 25010000003 PENICILLIN G POTASSIUM PER 600000 UNITS: Performed by: OBSTETRICS & GYNECOLOGY

## 2019-07-06 RX ORDER — SODIUM CHLORIDE, SODIUM LACTATE, POTASSIUM CHLORIDE, CALCIUM CHLORIDE 600; 310; 30; 20 MG/100ML; MG/100ML; MG/100ML; MG/100ML
INJECTION, SOLUTION INTRAVENOUS
Status: DISPENSED
Start: 2019-07-06 | End: 2019-07-06

## 2019-07-06 RX ORDER — OXYTOCIN/0.9 % SODIUM CHLORIDE 30/500 ML
PLASTIC BAG, INJECTION (ML) INTRAVENOUS
Status: COMPLETED
Start: 2019-07-06 | End: 2019-07-06

## 2019-07-06 RX ORDER — SODIUM CHLORIDE, SODIUM LACTATE, POTASSIUM CHLORIDE, CALCIUM CHLORIDE 600; 310; 30; 20 MG/100ML; MG/100ML; MG/100ML; MG/100ML
INJECTION, SOLUTION INTRAVENOUS
Status: COMPLETED
Start: 2019-07-06 | End: 2019-07-06

## 2019-07-06 RX ORDER — OXYTOCIN/0.9 % SODIUM CHLORIDE 30/500 ML
2 PLASTIC BAG, INJECTION (ML) INTRAVENOUS
Status: DISCONTINUED | OUTPATIENT
Start: 2019-07-06 | End: 2019-07-09

## 2019-07-06 RX ADMIN — SODIUM CHLORIDE 3 MILLION UNITS: 9 INJECTION, SOLUTION INTRAVENOUS at 05:59

## 2019-07-06 RX ADMIN — SODIUM CHLORIDE, POTASSIUM CHLORIDE, SODIUM LACTATE AND CALCIUM CHLORIDE 125 ML/HR: 600; 310; 30; 20 INJECTION, SOLUTION INTRAVENOUS at 04:23

## 2019-07-06 RX ADMIN — OXYTOCIN-SODIUM CHLORIDE 0.9% IV SOLN 30 UNIT/500ML 2 MILLI-UNITS/MIN: 30-0.9/5 SOLUTION at 05:19

## 2019-07-06 RX ADMIN — SODIUM CHLORIDE, SODIUM LACTATE, POTASSIUM CHLORIDE, CALCIUM CHLORIDE 125 ML/HR: 600; 310; 30; 20 INJECTION, SOLUTION INTRAVENOUS at 22:10

## 2019-07-06 RX ADMIN — SODIUM CHLORIDE 3 MILLION UNITS: 9 INJECTION, SOLUTION INTRAVENOUS at 15:25

## 2019-07-06 RX ADMIN — OXYTOCIN 2 MILLI-UNITS/MIN: 10 INJECTION INTRAVENOUS at 05:19

## 2019-07-06 RX ADMIN — SODIUM CHLORIDE 3 MILLION UNITS: 9 INJECTION, SOLUTION INTRAVENOUS at 10:13

## 2019-07-06 RX ADMIN — SODIUM CHLORIDE, POTASSIUM CHLORIDE, SODIUM LACTATE AND CALCIUM CHLORIDE 125 ML/HR: 600; 310; 30; 20 INJECTION, SOLUTION INTRAVENOUS at 22:10

## 2019-07-06 RX ADMIN — SODIUM CHLORIDE 3 MILLION UNITS: 9 INJECTION, SOLUTION INTRAVENOUS at 18:43

## 2019-07-06 RX ADMIN — SODIUM CHLORIDE 3 MILLION UNITS: 9 INJECTION, SOLUTION INTRAVENOUS at 22:30

## 2019-07-06 RX ADMIN — SODIUM CHLORIDE 3 MILLION UNITS: 9 INJECTION, SOLUTION INTRAVENOUS at 01:18

## 2019-07-06 NOTE — H&P
[facility]  Obstetric History and Physical    Chief Complaint   Patient presents with   • sent from office     high b/p, BPP in office of  and FHR decels on EFM in office           Patient is a 23 y.o. female  currently at 36w3d, who presents with has been followed for some time with gestational hypertension.  Urine proteins have been negative plan was for delivery at 37 weeks this coming week.  Seen in the office today biophysical profile was 6 of 8 and NST was initiated in the office but decelerations were noted by Ms. De Jesus and patient sent to labor and delivery in labor and delivery the strip was reactive and reassuring category 1 except for the one deceleration noted.  Given the gestation of 36-3/7 weeks with gestational hypertension biophysical profile 6 of 8 with what I think is a basically reassuring strip I discussed with her the risks benefits alternatives of delivery at this point versus following to 37 weeks I advised going ahead and delivering though I think there would be some increased risk of fetal immaturity issues and admission to the NICU.  She wants to go ahead the risk of hypertonic contractions damage mother baby possible increased risk of  section and its risk reviewed.Both the short-term and long-term risks of  section are reviewed at length.  Short-term risks of bleeding, infection, problems with wound healing, damage to bowel, bladder or ureter.  Small, but real risk of maternal mortality is reviewed and understood.    Long-term risks include in future pregnancies accreta abruption, uterine rupture and stillbirth, among others might be ameliorated by a .  The patient and her family have been given opportunity to ask questions and these questions have been answered to their satisfaction.    An echocardiogram had indicate mildly decreased ejection fraction and she had been seen by cardiology and their input appreciated         Obstetric History   #: 1, Date:  2016, Sex: None, Weight: None, GA: 7w1d, Delivery: Spontaneous , Apgar1: None, Apgar5: None, Living: None, Birth Comments: SAB    #: 2, Date: None, Sex: None, Weight: None, GA: None, Delivery: None, Apgar1: None, Apgar5: None, Living: None, Birth Comments: None       The following portions of the patients history were reviewed and updated as appropriate: current medications, allergies, past medical history, past surgical history, past family history, past social history and problem list .       Prenatal Information:  Prenatal Results     Initial Prenatal Labs     Test Value Reference Range Date Time    Hemoglobin 14.9 g/dL 12.0 - 15.5 g/dL 19 1156    Hematocrit 42.3 % 35.0 - 45.0 % 19 1156    Platelets 169 10*3/mm3 140 - 450 10*3/mm3 19 1812    Rubella IgG 125.0 IU/mL 0.0 - 9.9 IU/mL 19 1156      Immune  Immune 19 1156    Hepatitis B SAg Negative  Negative 19 1156    Hepatitis C Ab Negative  Negative 19 1156    RPR Non-Reactive  Non-Reactive 19 1156    ABO O   19 1812    Rh Positive   19 1812    Antibody Screen Negative   19 1156    HIV Negative  Negative 19 1156    Urine Culture        Gonorrhea Negative  Negative 19 1307    Chlamydia Negative  Negative 19 1307    TSH              2nd and 3rd Trimester     Test Value Reference Range Date Time    Hemoglobin (repeated) 13.0 g/dL 12.0 - 15.9 g/dL 19 1812    Hematocrit (repeated) 37.4 % 34.0 - 46.6 % 19 1812     mg/dL 74 - 180 mg/dL 19 0856    Antibody Screen (repeated) Negative   19 1812    GTT Fasting        GTT 1 Hr        GTT 2 Hr        GTT 3 Hr        Group B Strep Negative  Negative 19 1222          Drug Screening     Test Value Reference Range Date Time    Amphetamine Screen Negative  Negative 19 1313    Barbiturate Screen Negative  Negative 19 1812    Benzodiazepine Screen Negative  Negative 19 1812    Methadone  Screen Negative  Negative 07/05/19 1812    Phencyclidine Screen        Opiates Screen Negative  Negative 07/05/19 1812    THC Screen Negative  Negative 07/05/19 1812    Cocaine Screen Negative  Negative 07/05/19 1812    Propoxyphene Screen        Buprenorphine Screen        Methamphetamine Screen        Oxycodone Screen Negative  Negative 07/05/19 1812    Tricyclic Antidepressants Screen              Other (Risk screening)     Test Value Reference Range Date Time    Varicella IgG        Parvovirus IgG        CMV IgG        Cystic Fibrosis        Hemoglobin electrophoresis        NIPT        MSAFP-4        AFP (for NTD only)                  External Prenatal Results     Pregnancy Outside Results - Transcribed From Office Records - See Scanned Records For Details     Test Value Date Time    Hgb 13.0 g/dL 07/05/19 1812    Hct 37.4 % 07/05/19 1812    ABO O  07/05/19 1812    Rh Positive  07/05/19 1812    Antibody Screen Negative  07/05/19 1812    Glucose Fasting GTT       Glucose Tolerance Test 1 hour       Glucose Tolerance Test 3 hour       Gonorrhea (discrete) Negative  01/30/19 1307    Chlamydia (discrete) Negative  01/30/19 1307    RPR Non-Reactive  01/30/19 1156    VDRL       Syphilis Antibody       Rubella 125.0 IU/mL 01/30/19 1156      Immune  01/30/19 1156    HBsAg Negative  01/30/19 1156    Herpes Simplex Virus PCR       Herpes Simplex VIrus Culture       HIV Negative  01/30/19 1156    Hep C RNA Quant PCR       Hep C Antibody Negative  01/30/19 1156    AFP       Group B Strep Negative  06/25/19 1222    GBS Susceptibility to Clindamycin       GBS Susceptibility to Erythromycin       Fetal Fibronectin       Genetic Testing, Maternal Blood             Drug Screening     Test Value Date Time    Urine Drug Screen       Amphetamine Screen Negative  01/30/19 1313    Barbiturate Screen Negative  07/05/19 1812    Benzodiazepine Screen Negative  07/05/19 1812    Methadone Screen Negative  07/05/19 1812    Phencyclidine  Screen       Opiates Screen Negative  19    THC Screen Negative  19    Cocaine Screen       Propoxyphene Screen       Buprenorphine Screen       Methamphetamine Screen       Oxycodone Screen Negative  19    Tricyclic Antidepressants Screen                    Past OB History:     Obstetric History       T0      L0     SAB1   TAB0   Ectopic0   Molar0   Multiple0   Live Births0       # Outcome Date GA Lbr Jeff/2nd Weight Sex Delivery Anes PTL Lv   2 Current            1 2016 7w1d    SAB              ALLERGIES:   No Known Allergies     Home Medications:     Prior to Admission medications    Medication Sig Start Date End Date Taking? Authorizing Provider   calcium carbonate (TUMS) 500 MG chewable tablet Chew 2 tablets As Needed for Indigestion or Heartburn.   Yes Provider, MD Johnson   Prenatal Vit-Fe Fumarate-FA (PRENATAL VITAMIN) 27-0.8 MG tablet Take 1 tablet by mouth Daily.   Yes Provider, MD Johnson   omeprazole (PRILOSEC) 40 MG capsule Take 1 capsule by mouth Daily. 19   Hazel De Jesus APRN   ondansetron ODT (ZOFRAN ODT) 8 MG disintegrating tablet Take 1 tablet by mouth Every 8 (Eight) Hours As Needed for Nausea or Vomiting. 19   Hazel De Jesus APRN       Past Medical History: Past Medical History:   Diagnosis Date   • Gestational diabetes 2019   • Hypertension in pregnancy 2019   • Migraine    • PONV (postoperative nausea and vomiting)    • Varicella     vaccine      Past Surgical History Past Surgical History:   Procedure Laterality Date   • KNEE ARTHROSCOPY Right    • LAPAROSCOPIC CHOLECYSTECTOMY  2016   • WISDOM TOOTH EXTRACTION        Family History: Family History   Problem Relation Age of Onset   • No Known Problems Father    • No Known Problems Mother    • No Known Problems Brother    • No Known Problems Sister    • Breast cancer Paternal Grandfather    • No Known Problems Brother    • No Known Problems Brother    • No Known  Problems Brother    • No Known Problems Sister       Social History:  reports that she has never smoked. She has never used smokeless tobacco.   reports that she does not drink alcohol.   reports that she does not use drugs.        Review of Systems                                                                                                                  Neuro no history of brain tumor    HENT no history of ear tumors    Eye no history of retinal tumors    Pulmonary no history of lung tumors    Cardiac no history of cardiac tumors    GI: No history of small bowel tumors    Musculoskeletal: No history of skeletal muscle tumors    Endocrine: No history of adrenal tumors    Lymphatic: No history of Hodgkin's disease    Renal: No history of renal cancer      Objective       Vital Signs Range for the last 24 hours  Temperature: Temp:  [98.1 °F (36.7 °C)] 98.1 °F (36.7 °C)   Temp Source:     BP: BP: (101-138)/(63-82) 136/70   Pulse: Heart Rate:  [] 103   Respirations:     SPO2: SpO2:  [98 %-99 %] 98 %   O2 Amount (l/min):     O2 Devices Device (Oxygen Therapy): room air   Weight: Weight:  [111 kg (243 lb 14.4 oz)-112 kg (248 lb)] 112 kg (248 lb)       OBGyn Exam  Constitutional: Appears to be in no acute distress; Eyes: sclera normal; Endocrine system: thyroid palpate is normal; Pulmonary system: lungs clear; Cardiovascular system: heart regular rate and rhythm; Gastrointestinal system: abdomen soft nontender, active bowel sounds; Urologic system: CVA negative; Psychiatric: appropriate insight; Neurologic: gait within normal limits after 1 Cytotec her cervix is gone from closed to fingertip a Cook balloon is placed with 60 cc anor 30 outer and another Cytotec 25 placed      Last Labs  Lab Results   Component Value Date    WBC 9.77 07/05/2019    RBC 4.49 07/05/2019    HGB 13.0 07/05/2019    HCT 37.4 07/05/2019    MCV 83.3 07/05/2019    MCH 29.0 07/05/2019    MCHC 34.8 07/05/2019    RDW 12.0 (L) 07/05/2019     RDWSD 36.0 (L) 2019    MPV 12.9 (H) 2019     2019        Lab Results   Component Value Date    GLUCOSE 93 2019    BUN 6 2019    CREATININE 0.62 2019     2019    K 3.6 2019     2019    CO2 22.0 2019    CALCIUM 8.8 2019    PROTEINTOT 6.2 2019    ALBUMIN 3.30 (L) 2019    ALT 9 2019    AST 14 2019    ALKPHOS 176 (H) 2019    BILITOT 0.2 2019    EGFRIFNONA 122 2019    GLOB 2.9 2019    AGRATIO 1.1 2019    BCR 9.7 2019    ANIONGAP 12.0 2019       No results found for: HCGQUAL      Assessment/Plan:  1. 23 y.o. [id number].  Gestational hypertension biophysical profile 6 of 8 with decelerations on nonstress test since being in right labor and delivery of the strip is been reassuring after reviewing the risks benefits alternatives were gone plan for delivery.  Cytotec placed x2 and with a second Cytotec a Cook balloon placed as above                 This document has been electronically signed by Michele Cornejo MD on 2019 9:29 PM

## 2019-07-06 NOTE — PLAN OF CARE
Problem: Patient Care Overview  Goal: Plan of Care Review   07/06/19 0142   Coping/Psychosocial   Plan of Care Reviewed With patient   Plan of Care Review   Progress no change   OTHER   Outcome Summary VSS, no c/o pain, mom hoping to deliver naturally, worried about infants lungs, pt stated she had both of her betamethazone injections, pts  cant be present for delivery due to being , mom wants to cut infants cord      07/06/19 0142   Coping/Psychosocial   Plan of Care Reviewed With patient   Plan of Care Review   Progress no change   OTHER   Outcome Summary VSS, no c/o pain, mom hoping to deliver naturally, worried about infants lungs, pt stated she had both of her betamethazone injections, pts  cant be present for delivery due to being , mom wants to cut infants cord, currently has had 2 cytotec tabs placed and devlin bulb     Goal: Individualization and Mutuality  Outcome: Ongoing (interventions implemented as appropriate)    Goal: Discharge Needs Assessment  Outcome: Ongoing (interventions implemented as appropriate)    Goal: Interprofessional Rounds/Family Conf  Outcome: Ongoing (interventions implemented as appropriate)      Problem: Labor (Cervical Ripen, Induct, Augment) (Adult,Obstetrics,Pediatric)  Goal: Signs and Symptoms of Listed Potential Problems Will be Absent, Minimized or Managed (Labor)  Outcome: Ongoing (interventions implemented as appropriate)

## 2019-07-06 NOTE — PROGRESS NOTES
[facility] 1995  MRN: [id number]  CSN: [id number]    Labor progress note    pain that is well controlled    Min/max vitals past 24 hours:  Temp  Min: 98 °F (36.7 °C)  Max: 98.4 °F (36.9 °C)   BP  Min: 117/71  Max: 152/80   Pulse  Min: 81  Max: 111   Resp  Min: 20  Max: 20        FHT's: moderate variability   Cervix: 2-3   Contractions: regular         Plan   1. Augment with pitocin patient is near maxing out at 30.  Induction for gestational hypertension with abnormal fetal well-being testing with generally reassuring fetal heart rate monitoring.  After reviewing the risks benefits alternatives she wants to continue induced labor discussed limitations for length of this at about 24 hours if not making significant progress          This document has been electronically signed by Michele Cornejo MD on 2019 4:27 PM

## 2019-07-06 NOTE — PROGRESS NOTES
[facility]  [facility] 1995  MRN: [id number]  CSN: [id number]    Labor progress note    pain that is well controlled    Min/max vitals past 24 hours:  Temp  Min: 98 °F (36.7 °C)  Max: 98.4 °F (36.9 °C)   BP  Min: 101/76  Max: 152/80   Pulse  Min: 81  Max: 113   Resp  Min: 20  Max: 20        FHT's: moderate variability   Cervix: was checked:/ vertex/ -2   Contractions: regular         Plan   1. .  Patient did not have much of a response to cervical balloon.  Remains 1 cm 50%.  I did perform an amniotomy in place and internal lead after risks benefits alternatives reviewed.  She says she does not want epidural.          This document has been electronically signed by Michele Cornejo MD on 2019 9:49 AM

## 2019-07-07 ENCOUNTER — ANESTHESIA (OUTPATIENT)
Dept: LABOR AND DELIVERY | Facility: HOSPITAL | Age: 24
End: 2019-07-07

## 2019-07-07 ENCOUNTER — ANESTHESIA EVENT (OUTPATIENT)
Dept: LABOR AND DELIVERY | Facility: HOSPITAL | Age: 24
End: 2019-07-07

## 2019-07-07 PROBLEM — O26.892 HEARTBURN DURING PREGNANCY IN SECOND TRIMESTER: Status: RESOLVED | Noted: 2019-04-29 | Resolved: 2019-07-07

## 2019-07-07 PROBLEM — R12 HEARTBURN DURING PREGNANCY IN SECOND TRIMESTER: Status: RESOLVED | Noted: 2019-04-29 | Resolved: 2019-07-07

## 2019-07-07 PROBLEM — O28.8 NST (NON-STRESS TEST) WITH DECELERATIONS: Status: ACTIVE | Noted: 2019-07-07

## 2019-07-07 PROBLEM — Z23 NEED FOR DIPHTHERIA-TETANUS-PERTUSSIS (TDAP) VACCINE: Status: RESOLVED | Noted: 2019-06-09 | Resolved: 2019-07-07

## 2019-07-07 PROBLEM — O13.3 GESTATIONAL HYPERTENSION WITHOUT SIGNIFICANT PROTEINURIA IN THIRD TRIMESTER: Status: RESOLVED | Noted: 2019-06-25 | Resolved: 2019-07-07

## 2019-07-07 PROBLEM — O12.03 EDEMA DURING PREGNANCY IN THIRD TRIMESTER: Status: RESOLVED | Noted: 2019-05-28 | Resolved: 2019-07-07

## 2019-07-07 PROBLEM — Z3A.36 36 WEEKS GESTATION OF PREGNANCY: Status: ACTIVE | Noted: 2019-07-07

## 2019-07-07 PROBLEM — Z36.85 ANTENATAL SCREENING FOR STREPTOCOCCUS B: Status: RESOLVED | Noted: 2019-06-25 | Resolved: 2019-07-07

## 2019-07-07 PROBLEM — O26.843 UTERINE SIZE DATE DISCREPANCY PREGNANCY, THIRD TRIMESTER: Status: RESOLVED | Noted: 2019-06-02 | Resolved: 2019-07-07

## 2019-07-07 PROBLEM — O16.9 HYPERTENSION DURING PREGNANCY, ANTEPARTUM: Status: ACTIVE | Noted: 2019-07-07

## 2019-07-07 PROBLEM — Z34.90 PREGNANCY: Status: RESOLVED | Noted: 2019-07-05 | Resolved: 2019-07-07

## 2019-07-07 PROCEDURE — 87070 CULTURE OTHR SPECIMN AEROBIC: CPT | Performed by: OBSTETRICS & GYNECOLOGY

## 2019-07-07 PROCEDURE — 87075 CULTR BACTERIA EXCEPT BLOOD: CPT | Performed by: OBSTETRICS & GYNECOLOGY

## 2019-07-07 PROCEDURE — 51703 INSERT BLADDER CATH COMPLEX: CPT

## 2019-07-07 PROCEDURE — 87205 SMEAR GRAM STAIN: CPT | Performed by: OBSTETRICS & GYNECOLOGY

## 2019-07-07 PROCEDURE — 94762 N-INVAS EAR/PLS OXIMTRY CONT: CPT

## 2019-07-07 PROCEDURE — 25010000002 BUTORPHANOL PER 1 MG

## 2019-07-07 PROCEDURE — 25010000002 FENTANYL CITRATE (PF) 250 MCG/5ML SOLUTION: Performed by: NURSE ANESTHETIST, CERTIFIED REGISTERED

## 2019-07-07 PROCEDURE — 25010000002 PIPERACILLIN SOD-TAZOBACTAM PER 1 G: Performed by: OBSTETRICS & GYNECOLOGY

## 2019-07-07 PROCEDURE — 59510 CESAREAN DELIVERY: CPT | Performed by: OBSTETRICS & GYNECOLOGY

## 2019-07-07 PROCEDURE — 25010000002 KETOROLAC TROMETHAMINE PER 15 MG: Performed by: NURSE ANESTHETIST, CERTIFIED REGISTERED

## 2019-07-07 PROCEDURE — 25010000003 MORPHINE PER 10 MG: Performed by: OBSTETRICS & GYNECOLOGY

## 2019-07-07 PROCEDURE — C1755 CATHETER, INTRASPINAL: HCPCS | Performed by: NURSE ANESTHETIST, CERTIFIED REGISTERED

## 2019-07-07 PROCEDURE — 87176 TISSUE HOMOGENIZATION CULTR: CPT | Performed by: OBSTETRICS & GYNECOLOGY

## 2019-07-07 PROCEDURE — 94799 UNLISTED PULMONARY SVC/PX: CPT

## 2019-07-07 PROCEDURE — C1755 CATHETER, INTRASPINAL: HCPCS

## 2019-07-07 PROCEDURE — C1755 CATHETER, INTRASPINAL: HCPCS | Performed by: ANESTHESIOLOGY

## 2019-07-07 PROCEDURE — 88307 TISSUE EXAM BY PATHOLOGIST: CPT | Performed by: PATHOLOGY

## 2019-07-07 PROCEDURE — 25010000002 PROMETHAZINE PER 50 MG

## 2019-07-07 PROCEDURE — 25010000002 PENICILLIN G POTASSIUM PER 600000 UNITS: Performed by: OBSTETRICS & GYNECOLOGY

## 2019-07-07 PROCEDURE — 25010000003 PENICILLIN G POTASSIUM PER 600000 UNITS: Performed by: OBSTETRICS & GYNECOLOGY

## 2019-07-07 PROCEDURE — 88307 TISSUE EXAM BY PATHOLOGIST: CPT | Performed by: OBSTETRICS & GYNECOLOGY

## 2019-07-07 RX ORDER — DIPHENHYDRAMINE HCL 25 MG
25 CAPSULE ORAL EVERY 4 HOURS PRN
Status: DISCONTINUED | OUTPATIENT
Start: 2019-07-07 | End: 2019-07-07 | Stop reason: SDUPTHER

## 2019-07-07 RX ORDER — IBUPROFEN 600 MG/1
600 TABLET ORAL EVERY 8 HOURS PRN
Status: DISCONTINUED | OUTPATIENT
Start: 2019-07-07 | End: 2019-07-08

## 2019-07-07 RX ORDER — BUTORPHANOL TARTRATE 1 MG/ML
INJECTION, SOLUTION INTRAMUSCULAR; INTRAVENOUS
Status: COMPLETED
Start: 2019-07-07 | End: 2019-07-07

## 2019-07-07 RX ORDER — BUTORPHANOL TARTRATE 1 MG/ML
1 INJECTION, SOLUTION INTRAMUSCULAR; INTRAVENOUS ONCE
Status: COMPLETED | OUTPATIENT
Start: 2019-07-07 | End: 2019-07-07

## 2019-07-07 RX ORDER — SODIUM CHLORIDE, SODIUM LACTATE, POTASSIUM CHLORIDE, CALCIUM CHLORIDE 600; 310; 30; 20 MG/100ML; MG/100ML; MG/100ML; MG/100ML
INJECTION, SOLUTION INTRAVENOUS
Status: DISPENSED
Start: 2019-07-07 | End: 2019-07-07

## 2019-07-07 RX ORDER — PROMETHAZINE HYDROCHLORIDE 25 MG/ML
12.5 INJECTION, SOLUTION INTRAMUSCULAR; INTRAVENOUS EVERY 4 HOURS PRN
Status: DISCONTINUED | OUTPATIENT
Start: 2019-07-07 | End: 2019-07-10 | Stop reason: HOSPADM

## 2019-07-07 RX ORDER — OXYTOCIN/0.9 % SODIUM CHLORIDE 30/500 ML
650 PLASTIC BAG, INJECTION (ML) INTRAVENOUS ONCE
Status: DISCONTINUED | OUTPATIENT
Start: 2019-07-07 | End: 2019-07-09

## 2019-07-07 RX ORDER — BUPIVACAINE HYDROCHLORIDE 5 MG/ML
INJECTION, SOLUTION EPIDURAL; INTRACAUDAL AS NEEDED
Status: DISCONTINUED | OUTPATIENT
Start: 2019-07-07 | End: 2019-07-07 | Stop reason: SURG

## 2019-07-07 RX ORDER — PROMETHAZINE HYDROCHLORIDE 25 MG/ML
12.5 INJECTION, SOLUTION INTRAMUSCULAR; INTRAVENOUS ONCE AS NEEDED
Status: DISCONTINUED | OUTPATIENT
Start: 2019-07-07 | End: 2019-07-07 | Stop reason: SDUPTHER

## 2019-07-07 RX ORDER — NALOXONE HCL 0.4 MG/ML
0.1 VIAL (ML) INJECTION
Status: DISCONTINUED | OUTPATIENT
Start: 2019-07-07 | End: 2019-07-07

## 2019-07-07 RX ORDER — SIMETHICONE 80 MG
80 TABLET,CHEWABLE ORAL 4 TIMES DAILY PRN
Status: DISCONTINUED | OUTPATIENT
Start: 2019-07-07 | End: 2019-07-10 | Stop reason: HOSPADM

## 2019-07-07 RX ORDER — MORPHINE SULFATE 1 MG/ML
INJECTION INTRAVENOUS CONTINUOUS
Status: DISCONTINUED | OUTPATIENT
Start: 2019-07-07 | End: 2019-07-09

## 2019-07-07 RX ORDER — MISOPROSTOL 200 UG/1
800 TABLET ORAL AS NEEDED
Status: DISCONTINUED | OUTPATIENT
Start: 2019-07-07 | End: 2019-07-07 | Stop reason: HOSPADM

## 2019-07-07 RX ORDER — FENTANYL CITRATE 50 UG/ML
INJECTION, SOLUTION INTRAMUSCULAR; INTRAVENOUS AS NEEDED
Status: DISCONTINUED | OUTPATIENT
Start: 2019-07-07 | End: 2019-07-07 | Stop reason: SURG

## 2019-07-07 RX ORDER — KETOROLAC TROMETHAMINE 30 MG/ML
INJECTION, SOLUTION INTRAMUSCULAR; INTRAVENOUS AS NEEDED
Status: DISCONTINUED | OUTPATIENT
Start: 2019-07-07 | End: 2019-07-07 | Stop reason: SURG

## 2019-07-07 RX ORDER — SODIUM CHLORIDE 9 MG/ML
200 INJECTION, SOLUTION INTRAVENOUS CONTINUOUS
Status: DISCONTINUED | OUTPATIENT
Start: 2019-07-07 | End: 2019-07-07

## 2019-07-07 RX ORDER — SODIUM CHLORIDE 9 MG/ML
INJECTION, SOLUTION INTRAVENOUS
Status: COMPLETED
Start: 2019-07-07 | End: 2019-07-07

## 2019-07-07 RX ORDER — PROMETHAZINE HYDROCHLORIDE 25 MG/1
25 SUPPOSITORY RECTAL ONCE AS NEEDED
Status: DISCONTINUED | OUTPATIENT
Start: 2019-07-07 | End: 2019-07-07 | Stop reason: SDUPTHER

## 2019-07-07 RX ORDER — BISACODYL 5 MG/1
10 TABLET, DELAYED RELEASE ORAL DAILY PRN
Status: DISCONTINUED | OUTPATIENT
Start: 2019-07-07 | End: 2019-07-10 | Stop reason: HOSPADM

## 2019-07-07 RX ORDER — DIPHENHYDRAMINE HYDROCHLORIDE 50 MG/ML
25 INJECTION INTRAMUSCULAR; INTRAVENOUS EVERY 6 HOURS PRN
Status: DISCONTINUED | OUTPATIENT
Start: 2019-07-07 | End: 2019-07-10 | Stop reason: HOSPADM

## 2019-07-07 RX ORDER — OXYTOCIN/0.9 % SODIUM CHLORIDE 30/500 ML
85 PLASTIC BAG, INJECTION (ML) INTRAVENOUS ONCE
Status: DISCONTINUED | OUTPATIENT
Start: 2019-07-07 | End: 2019-07-09

## 2019-07-07 RX ORDER — PROMETHAZINE HYDROCHLORIDE 25 MG/ML
INJECTION, SOLUTION INTRAMUSCULAR; INTRAVENOUS
Status: COMPLETED
Start: 2019-07-07 | End: 2019-07-07

## 2019-07-07 RX ORDER — PROMETHAZINE HYDROCHLORIDE 25 MG/ML
12.5 INJECTION, SOLUTION INTRAMUSCULAR; INTRAVENOUS EVERY 4 HOURS PRN
Status: DISCONTINUED | OUTPATIENT
Start: 2019-07-07 | End: 2019-07-07

## 2019-07-07 RX ORDER — ONDANSETRON 2 MG/ML
4 INJECTION INTRAMUSCULAR; INTRAVENOUS ONCE AS NEEDED
Status: DISCONTINUED | OUTPATIENT
Start: 2019-07-07 | End: 2019-07-10 | Stop reason: HOSPADM

## 2019-07-07 RX ORDER — ALUMINA, MAGNESIA, AND SIMETHICONE 2400; 2400; 240 MG/30ML; MG/30ML; MG/30ML
15 SUSPENSION ORAL EVERY 4 HOURS PRN
Status: DISCONTINUED | OUTPATIENT
Start: 2019-07-07 | End: 2019-07-10 | Stop reason: HOSPADM

## 2019-07-07 RX ORDER — DOCUSATE SODIUM 100 MG/1
100 CAPSULE, LIQUID FILLED ORAL 2 TIMES DAILY
Status: DISCONTINUED | OUTPATIENT
Start: 2019-07-07 | End: 2019-07-10 | Stop reason: HOSPADM

## 2019-07-07 RX ORDER — TRISODIUM CITRATE DIHYDRATE AND CITRIC ACID MONOHYDRATE 500; 334 MG/5ML; MG/5ML
30 SOLUTION ORAL ONCE
Status: COMPLETED | OUTPATIENT
Start: 2019-07-07 | End: 2019-07-07

## 2019-07-07 RX ORDER — METHYLERGONOVINE MALEATE 0.2 MG/ML
200 INJECTION INTRAVENOUS ONCE AS NEEDED
Status: DISCONTINUED | OUTPATIENT
Start: 2019-07-07 | End: 2019-07-07 | Stop reason: HOSPADM

## 2019-07-07 RX ORDER — PROMETHAZINE HYDROCHLORIDE 25 MG/ML
12.5 INJECTION, SOLUTION INTRAMUSCULAR; INTRAVENOUS ONCE
Status: COMPLETED | OUTPATIENT
Start: 2019-07-07 | End: 2019-07-07

## 2019-07-07 RX ORDER — SODIUM CHLORIDE, SODIUM LACTATE, POTASSIUM CHLORIDE, CALCIUM CHLORIDE 600; 310; 30; 20 MG/100ML; MG/100ML; MG/100ML; MG/100ML
INJECTION, SOLUTION INTRAVENOUS
Status: COMPLETED
Start: 2019-07-07 | End: 2019-07-07

## 2019-07-07 RX ORDER — BUPIVACAINE HYDROCHLORIDE 5 MG/ML
INJECTION, SOLUTION EPIDURAL; INTRACAUDAL AS NEEDED
Status: DISCONTINUED | OUTPATIENT
Start: 2019-07-07 | End: 2019-07-07

## 2019-07-07 RX ORDER — OXYTOCIN/0.9 % SODIUM CHLORIDE 30/500 ML
PLASTIC BAG, INJECTION (ML) INTRAVENOUS
Status: COMPLETED
Start: 2019-07-07 | End: 2019-07-07

## 2019-07-07 RX ORDER — PROMETHAZINE HYDROCHLORIDE 25 MG/1
25 TABLET ORAL ONCE AS NEEDED
Status: DISCONTINUED | OUTPATIENT
Start: 2019-07-07 | End: 2019-07-07 | Stop reason: SDUPTHER

## 2019-07-07 RX ORDER — KETOROLAC TROMETHAMINE 15 MG/ML
15 INJECTION, SOLUTION INTRAMUSCULAR; INTRAVENOUS EVERY 6 HOURS PRN
Status: DISCONTINUED | OUTPATIENT
Start: 2019-07-07 | End: 2019-07-10 | Stop reason: HOSPADM

## 2019-07-07 RX ORDER — NALOXONE HCL 0.4 MG/ML
0.1 VIAL (ML) INJECTION
Status: DISCONTINUED | OUTPATIENT
Start: 2019-07-07 | End: 2019-07-10 | Stop reason: HOSPADM

## 2019-07-07 RX ORDER — CARBOPROST TROMETHAMINE 250 UG/ML
250 INJECTION, SOLUTION INTRAMUSCULAR AS NEEDED
Status: DISCONTINUED | OUTPATIENT
Start: 2019-07-07 | End: 2019-07-07 | Stop reason: HOSPADM

## 2019-07-07 RX ORDER — LIDOCAINE HYDROCHLORIDE 20 MG/ML
INJECTION, SOLUTION EPIDURAL; INFILTRATION; INTRACAUDAL; PERINEURAL AS NEEDED
Status: DISCONTINUED | OUTPATIENT
Start: 2019-07-07 | End: 2019-07-07 | Stop reason: SURG

## 2019-07-07 RX ORDER — DEXAMETHASONE SODIUM PHOSPHATE 4 MG/ML
8 INJECTION, SOLUTION INTRA-ARTICULAR; INTRALESIONAL; INTRAMUSCULAR; INTRAVENOUS; SOFT TISSUE ONCE AS NEEDED
Status: DISCONTINUED | OUTPATIENT
Start: 2019-07-07 | End: 2019-07-10 | Stop reason: HOSPADM

## 2019-07-07 RX ORDER — PRENATAL VIT/IRON FUM/FOLIC AC 27MG-0.8MG
1 TABLET ORAL DAILY
Status: DISCONTINUED | OUTPATIENT
Start: 2019-07-07 | End: 2019-07-10 | Stop reason: HOSPADM

## 2019-07-07 RX ORDER — LIDOCAINE HYDROCHLORIDE AND EPINEPHRINE 15; 5 MG/ML; UG/ML
INJECTION, SOLUTION EPIDURAL AS NEEDED
Status: DISCONTINUED | OUTPATIENT
Start: 2019-07-07 | End: 2019-07-07 | Stop reason: SURG

## 2019-07-07 RX ORDER — LANOLIN 100 %
OINTMENT (GRAM) TOPICAL
Status: DISCONTINUED | OUTPATIENT
Start: 2019-07-07 | End: 2019-07-10 | Stop reason: HOSPADM

## 2019-07-07 RX ORDER — ONDANSETRON 4 MG/1
8 TABLET, ORALLY DISINTEGRATING ORAL EVERY 8 HOURS PRN
Status: DISCONTINUED | OUTPATIENT
Start: 2019-07-07 | End: 2019-07-10 | Stop reason: HOSPADM

## 2019-07-07 RX ORDER — CALCIUM CARBONATE 200(500)MG
2 TABLET,CHEWABLE ORAL AS NEEDED
Status: DISCONTINUED | OUTPATIENT
Start: 2019-07-07 | End: 2019-07-10 | Stop reason: HOSPADM

## 2019-07-07 RX ORDER — PANTOPRAZOLE SODIUM 40 MG/1
40 TABLET, DELAYED RELEASE ORAL EVERY MORNING
Status: DISCONTINUED | OUTPATIENT
Start: 2019-07-08 | End: 2019-07-10 | Stop reason: HOSPADM

## 2019-07-07 RX ORDER — OXYCODONE AND ACETAMINOPHEN 7.5; 325 MG/1; MG/1
1 TABLET ORAL EVERY 4 HOURS PRN
Status: DISCONTINUED | OUTPATIENT
Start: 2019-07-07 | End: 2019-07-09

## 2019-07-07 RX ADMIN — EPHEDRINE SULFATE 12.5 MG: 50 INJECTION INTRAMUSCULAR; INTRAVENOUS; SUBCUTANEOUS at 08:12

## 2019-07-07 RX ADMIN — SODIUM CHLORIDE, POTASSIUM CHLORIDE, SODIUM LACTATE AND CALCIUM CHLORIDE 125 ML/HR: 600; 310; 30; 20 INJECTION, SOLUTION INTRAVENOUS at 20:42

## 2019-07-07 RX ADMIN — MORPHINE SULFATE: 1 INJECTION INTRAVENOUS at 17:48

## 2019-07-07 RX ADMIN — PROMETHAZINE HYDROCHLORIDE 12.5 MG: 25 INJECTION INTRAMUSCULAR; INTRAVENOUS at 04:01

## 2019-07-07 RX ADMIN — LIDOCAINE HYDROCHLORIDE 20 ML: 20 INJECTION, SOLUTION EPIDURAL; INFILTRATION; INTRACAUDAL; PERINEURAL at 11:45

## 2019-07-07 RX ADMIN — FENTANYL CITRATE 100 MCG: 50 INJECTION, SOLUTION INTRAMUSCULAR; INTRAVENOUS at 11:45

## 2019-07-07 RX ADMIN — Medication 1 ML/HR: at 07:30

## 2019-07-07 RX ADMIN — KETOROLAC TROMETHAMINE 30 MG: 30 INJECTION, SOLUTION INTRAMUSCULAR at 13:00

## 2019-07-07 RX ADMIN — SODIUM CITRATE AND CITRIC ACID MONOHYDRATE 30 ML: 500; 334 SOLUTION ORAL at 12:11

## 2019-07-07 RX ADMIN — BUTORPHANOL TARTRATE 1 MG: 1 INJECTION, SOLUTION INTRAMUSCULAR; INTRAVENOUS at 05:02

## 2019-07-07 RX ADMIN — BUTORPHANOL TARTRATE 1 MG: 1 INJECTION, SOLUTION INTRAMUSCULAR; INTRAVENOUS at 04:01

## 2019-07-07 RX ADMIN — SODIUM CHLORIDE 400 ML: 9 INJECTION, SOLUTION INTRAVENOUS at 08:42

## 2019-07-07 RX ADMIN — SODIUM CHLORIDE 3 MILLION UNITS: 9 INJECTION, SOLUTION INTRAVENOUS at 08:58

## 2019-07-07 RX ADMIN — LIDOCAINE HYDROCHLORIDE AND EPINEPHRINE 3 ML: 15; 5 INJECTION, SOLUTION EPIDURAL at 07:26

## 2019-07-07 RX ADMIN — SODIUM CHLORIDE 3 MILLION UNITS: 9 INJECTION, SOLUTION INTRAVENOUS at 04:15

## 2019-07-07 RX ADMIN — OXYTOCIN-SODIUM CHLORIDE 0.9% IV SOLN 30 UNIT/500ML 100 ML: 30-0.9/5 SOLUTION at 12:45

## 2019-07-07 RX ADMIN — PROMETHAZINE HYDROCHLORIDE 12.5 MG: 25 INJECTION, SOLUTION INTRAMUSCULAR; INTRAVENOUS at 05:02

## 2019-07-07 RX ADMIN — OXYTOCIN-SODIUM CHLORIDE 0.9% IV SOLN 30 UNIT/500ML 30 MILLI-UNITS/MIN: 30-0.9/5 SOLUTION at 03:00

## 2019-07-07 RX ADMIN — BUPIVACAINE HYDROCHLORIDE 5 ML: 5 INJECTION, SOLUTION EPIDURAL; INTRACAUDAL; PERINEURAL at 12:35

## 2019-07-07 RX ADMIN — PIPERACILLIN SODIUM AND TAZOBACTAM SODIUM 4.5 G: 4; .5 INJECTION, POWDER, LYOPHILIZED, FOR SOLUTION INTRAVENOUS at 18:19

## 2019-07-07 RX ADMIN — PROMETHAZINE HYDROCHLORIDE 12.5 MG: 25 INJECTION INTRAMUSCULAR; INTRAVENOUS at 05:02

## 2019-07-07 RX ADMIN — LIDOCAINE HYDROCHLORIDE 15 ML: 20 INJECTION, SOLUTION EPIDURAL; INFILTRATION; INTRACAUDAL; PERINEURAL at 09:50

## 2019-07-07 RX ADMIN — EPHEDRINE SULFATE 12.5 MG: 50 INJECTION INTRAMUSCULAR; INTRAVENOUS; SUBCUTANEOUS at 07:57

## 2019-07-07 RX ADMIN — PROMETHAZINE HYDROCHLORIDE 12.5 MG: 25 INJECTION, SOLUTION INTRAMUSCULAR; INTRAVENOUS at 04:01

## 2019-07-07 RX ADMIN — PIPERACILLIN SODIUM AND TAZOBACTAM SODIUM 4.5 G: 4; .5 INJECTION, POWDER, LYOPHILIZED, FOR SOLUTION INTRAVENOUS at 10:29

## 2019-07-07 RX ADMIN — FENTANYL CITRATE 100 MCG: 50 INJECTION, SOLUTION INTRAMUSCULAR; INTRAVENOUS at 07:30

## 2019-07-07 RX ADMIN — OXYTOCIN-SODIUM CHLORIDE 0.9% IV SOLN 30 UNIT/500ML 100 ML: 30-0.9/5 SOLUTION at 12:38

## 2019-07-07 RX ADMIN — BUPIVACAINE HYDROCHLORIDE 10 ML: 5 INJECTION, SOLUTION EPIDURAL; INTRACAUDAL; PERINEURAL at 12:50

## 2019-07-07 RX ADMIN — Medication 12 ML/HR: at 07:43

## 2019-07-07 NOTE — ANESTHESIA POSTPROCEDURE EVALUATION
Patient: Valente Brown    Procedure Summary     Date:  07/07/19 Room / Location:      Anesthesia Start:  1227 Anesthesia Stop:  1335    Procedure:  C/S Diagnosis:      Scheduled Providers:   Provider:  Pan Saldivar MD    Anesthesia Type:  epidural ASA Status:  3 - Emergent          Anesthesia Type: No value filed.  Last vitals  BP   91/42 (07/07/19 0820)   Temp   98 °F (36.7 °C) (07/07/19 0937)   Pulse   111 (07/07/19 0820)   Resp   20 (07/06/19 0124)     SpO2   97 % (07/07/19 0817)     Post Anesthesia Care and Evaluation    Patient location during evaluation: bedside  Patient participation: complete - patient participated  Level of consciousness: sleepy but conscious  Pain score: 0  Pain management: adequate  Airway patency: patent  Anesthetic complications: No anesthetic complications  PONV Status: none  Cardiovascular status: acceptable and stable  Respiratory status: acceptable  Hydration status: stable    Comments: Epidural cath D/C tip intact, site clear.

## 2019-07-07 NOTE — PROGRESS NOTES
Ruptured for 29 hours remains 6 cm.  Has developed tenderness of the upper fundus.  Periods of fetal tachycardia and maternal tachycardia.  Not febrile overall though I feel intra-amniotic infection.  I recommended  section which was delayed by an emergency  section.  Chorioamnionitis in itself is not an indication for  section however I think the outlook for delivery is very limited and I do not think the baby will tolerate Pitocin and the likelihood of being able to administer Pitocin is very limited.  The risks benefits alternatives  section and the increased risk of  section in this situation reviewed. Both the short-term and long-term risks of  section are reviewed at length.  Short-term risks of bleeding, infection, problems with wound healing, damage to bowel, bladder or ureter.  Small, but real risk of maternal mortality is reviewed and understood.    Long-term risks include in future pregnancies accreta abruption, uterine rupture and stillbirth, among others might be ameliorated by a .  The patient and her family have been given opportunity to ask questions and these questions have been answered to their satisfaction.

## 2019-07-07 NOTE — ANESTHESIA PREPROCEDURE EVALUATION
Anesthesia Evaluation     history of anesthetic complications: PONV  NPO Solid Status: > 8 hours  NPO Liquid Status: < 2 hours           Airway   Mallampati: II  Neck ROM: full  Possible difficult intubation  Dental - normal exam     Pulmonary - negative pulmonary ROS    breath sounds clear to auscultation  Cardiovascular     Rhythm: regular  Rate: abnormal    (+) hypertension well controlled, murmur,       Neuro/Psych  (+) headaches,     GI/Hepatic/Renal/Endo    (+) obesity, morbid obesity,  diabetes mellitus,     Musculoskeletal     Abdominal   (+) obese,    Substance History      OB/GYN    (+) Pregnant, pregnancy induced hypertension        Other        ROS/Med Hx Other: I am unable to palpate spinus process in lumbar region.                  Anesthesia Plan    ASA 3 - emergent     epidural     Anesthetic plan, all risks, benefits, and alternatives have been provided, discussed and informed consent has been obtained with: patient and other.

## 2019-07-07 NOTE — ANESTHESIA PREPROCEDURE EVALUATION
Anesthesia Evaluation                  Airway   Mallampati: II  TM distance: >3 FB  Neck ROM: full  Possible difficult intubation  Dental      Pulmonary     breath sounds clear to auscultation  Cardiovascular     Rhythm: regular  Rate: normal    (+) murmur,       Neuro/Psych  GI/Hepatic/Renal/Endo      Musculoskeletal     Abdominal   (+) obese,    Substance History      OB/GYN          Other                        Anesthesia Plan    ASA 3 - emergent     epidural     Anesthetic plan, all risks, benefits, and alternatives have been provided, discussed and informed consent has been obtained with: patient and other.

## 2019-07-07 NOTE — ANESTHESIA PROCEDURE NOTES
Labor Epidural      Additional Notes  Epidural placed for labor this AM and I am using as the anesthetic for C/S

## 2019-07-07 NOTE — ANESTHESIA POSTPROCEDURE EVALUATION
Patient: Valente Brown    Procedure Summary     Date:  19 Room / Location:  [facility] LABOR DELIVERY  [facility] LABOR DELIVERY    Anesthesia Start:  936 Anesthesia Stop:      Procedure:   SECTION PRIMARY (N/A Abdomen) Diagnosis:      Surgeon:  Michele Cornejo MD Provider:      Anesthesia Type:  epidural ASA Status:  3 - Emergent          Anesthesia Type: epidural  Last vitals  BP   91/42 (19)   Temp   98 °F (36.7 °C) (19 0937)   Pulse   111 (19)   Resp   20 (19 0124)     SpO2   97 % (19)     Post Anesthesia Care and Evaluation    Patient location during evaluation: bedside  Patient participation: complete - patient participated  Level of consciousness: awake  Pain score: 0  Pain management: adequate  Airway patency: patent  Anesthetic complications: No anesthetic complications  PONV Status: none  Cardiovascular status: acceptable  Respiratory status: acceptable  Hydration status: acceptable

## 2019-07-07 NOTE — ANESTHESIA PROCEDURE NOTES
Labor Epidural      Patient location during procedure: OB  Start Time: 7/7/2019 6:45 AM  Performed By  CRNA: Jesús Silver CRNA  Preanesthetic Checklist  Completed: patient identified, site marked, surgical consent, pre-op evaluation, timeout performed, IV checked, risks and benefits discussed and monitors and equipment checked  Additional Notes  I was unable to palpate spinus process in lumbar area.  Redirected the needle several attempts at approximately L3-4.  Changed to approximately L2-3 with much easier ID of epidural space.  Prep:  Sterile Tech:gloves, cap, sterile barrier and mask  Prep:povidone-iodine 7.5% surgical scrub  Monitoring:continuous pulse oximetry, EKG and blood pressure monitoring  Epidural Block Procedure:  Approach:midline  Location:L2-L3  Needle Type:Robinson  Loss of Resistance Medium: saline  Paresthesia: transient  Aspiration:negative  Test Dose:negative  Number of Attempts: 2  Post Assessment:  Dressing:secured with tape and occlusive dressing applied  Pt Tolerance:patient tolerated the procedure well with no apparent complications  Complications:no

## 2019-07-07 NOTE — ADDENDUM NOTE
Addendum  created 07/07/19 1736 by Garrick Holbrook MD    Allergies reviewed, Medication List reviewed, Problem List reviewed, Review and Sign - Ready for Procedure, Review and Sign - Signed, Sign clinical note

## 2019-07-07 NOTE — PROGRESS NOTES
[facility] 1995  MRN: [id number]  CSN: [id number]    Labor progress note    being comfortable with her epidural    Min/max vitals past 24 hours:  Temp  Min: 98 °F (36.7 °C)  Max: 98.3 °F (36.8 °C)   BP  Min: 91/42  Max: 159/93   Pulse  Min: 75  Max: 153   No Data Recorded        FHT's: moderate variability   Cervix: was checked:/ vertex/ -1   Contractions: irregular         Plan   1. .  Patient rupture 26 hours I reviewed the risk of chorioamnionitis with her.  She is starting to make significant change we did however have to turn the Pitocin off after epidural because of periodic changes which I think were related to hypotension.  She strongly wants to try further labor risks benefits alternatives reviewed at length she agrees if she does not make significant change by late afternoon for  section          This document has been electronically signed by Michele Cornejo MD on 2019 8:59 AM

## 2019-07-07 NOTE — PLAN OF CARE
Problem: Patient Care Overview  Goal: Plan of Care Review  Outcome: Ongoing (interventions implemented as appropriate)   07/07/19 4812   Coping/Psychosocial   Plan of Care Reviewed With patient   OTHER   Outcome Summary VSS, currently 4cm dialated. was told by dr. underwood if not 6-7 cm by morning will do c/section, pt has tried peanut ball, birthing ball, swaying by bedside, frogleg, pt is really wanting a natural vaginal delivery, [place]  [place] [employer], mom is wanting to cut babys cord     Goal: Interprofessional Rounds/Family Conf  Outcome: Ongoing (interventions implemented as appropriate)      Problem: Labor (Cervical Ripen, Induct, Augment) (Adult,Obstetrics,Pediatric)  Goal: Signs and Symptoms of Listed Potential Problems Will be Absent, Minimized or Managed (Labor)  Outcome: Ongoing (interventions implemented as appropriate)

## 2019-07-08 LAB
DEPRECATED RDW RBC AUTO: 37.9 FL (ref 37–54)
ERYTHROCYTE [DISTWIDTH] IN BLOOD BY AUTOMATED COUNT: 12.5 % (ref 12.3–15.4)
HCT VFR BLD AUTO: 30 % (ref 34–46.6)
HGB BLD-MCNC: 10.2 G/DL (ref 12–15.9)
LYMPHOCYTES # BLD MANUAL: 2.6 10*3/MM3 (ref 0.7–3.1)
LYMPHOCYTES NFR BLD MANUAL: 22 % (ref 19.6–45.3)
LYMPHOCYTES NFR BLD MANUAL: 4 % (ref 5–12)
MCH RBC QN AUTO: 28.7 PG (ref 26.6–33)
MCHC RBC AUTO-ENTMCNC: 34 G/DL (ref 31.5–35.7)
MCV RBC AUTO: 84.5 FL (ref 79–97)
MONOCYTES # BLD AUTO: 0.47 10*3/MM3 (ref 0.1–0.9)
NEUTROPHILS # BLD AUTO: 8.76 10*3/MM3 (ref 1.7–7)
NEUTROPHILS NFR BLD MANUAL: 73 % (ref 42.7–76)
NEUTS BAND NFR BLD MANUAL: 1 % (ref 0–5)
PLATELET # BLD AUTO: 136 10*3/MM3 (ref 140–450)
PMV BLD AUTO: 12.4 FL (ref 6–12)
PROCALCITONIN SERPL-MCNC: 0.15 NG/ML (ref 0.1–0.25)
RBC # BLD AUTO: 3.55 10*6/MM3 (ref 3.77–5.28)
RBC MORPH BLD: NORMAL
SMALL PLATELETS BLD QL SMEAR: ADEQUATE
WBC MORPH BLD: NORMAL
WBC NRBC COR # BLD: 11.84 10*3/MM3 (ref 3.4–10.8)

## 2019-07-08 PROCEDURE — 25010000002 PIPERACILLIN SOD-TAZOBACTAM PER 1 G: Performed by: OBSTETRICS & GYNECOLOGY

## 2019-07-08 PROCEDURE — 85007 BL SMEAR W/DIFF WBC COUNT: CPT | Performed by: OBSTETRICS & GYNECOLOGY

## 2019-07-08 PROCEDURE — 84145 PROCALCITONIN (PCT): CPT | Performed by: OBSTETRICS & GYNECOLOGY

## 2019-07-08 PROCEDURE — 85027 COMPLETE CBC AUTOMATED: CPT | Performed by: OBSTETRICS & GYNECOLOGY

## 2019-07-08 PROCEDURE — 99024 POSTOP FOLLOW-UP VISIT: CPT | Performed by: OBSTETRICS & GYNECOLOGY

## 2019-07-08 RX ORDER — IBUPROFEN 600 MG/1
600 TABLET ORAL EVERY 6 HOURS PRN
Status: DISCONTINUED | OUTPATIENT
Start: 2019-07-08 | End: 2019-07-10 | Stop reason: HOSPADM

## 2019-07-08 RX ORDER — AMOXICILLIN AND CLAVULANATE POTASSIUM 875; 125 MG/1; MG/1
1 TABLET, FILM COATED ORAL EVERY 12 HOURS SCHEDULED
Status: DISCONTINUED | OUTPATIENT
Start: 2019-07-08 | End: 2019-07-10 | Stop reason: HOSPADM

## 2019-07-08 RX ADMIN — PIPERACILLIN SODIUM AND TAZOBACTAM SODIUM 4.5 G: 4; .5 INJECTION, POWDER, LYOPHILIZED, FOR SOLUTION INTRAVENOUS at 00:24

## 2019-07-08 RX ADMIN — DOCUSATE SODIUM 100 MG: 100 CAPSULE, LIQUID FILLED ORAL at 00:23

## 2019-07-08 RX ADMIN — OXYCODONE HYDROCHLORIDE AND ACETAMINOPHEN 1 TABLET: 7.5; 325 TABLET ORAL at 15:53

## 2019-07-08 RX ADMIN — PIPERACILLIN SODIUM AND TAZOBACTAM SODIUM 4.5 G: 4; .5 INJECTION, POWDER, LYOPHILIZED, FOR SOLUTION INTRAVENOUS at 08:53

## 2019-07-08 RX ADMIN — PANTOPRAZOLE SODIUM 40 MG: 40 TABLET, DELAYED RELEASE ORAL at 08:00

## 2019-07-08 RX ADMIN — AMOXICILLIN AND CLAVULANATE POTASSIUM 1 TABLET: 875; 125 TABLET, FILM COATED ORAL at 21:28

## 2019-07-08 RX ADMIN — IBUPROFEN 600 MG: 600 TABLET ORAL at 18:42

## 2019-07-08 RX ADMIN — OXYCODONE HYDROCHLORIDE AND ACETAMINOPHEN 1 TABLET: 7.5; 325 TABLET ORAL at 21:31

## 2019-07-08 RX ADMIN — PRENATAL VIT W/ FE FUMARATE-FA TAB 27-0.8 MG 1 TABLET: 27-0.8 TAB at 08:53

## 2019-07-08 RX ADMIN — OXYCODONE HYDROCHLORIDE AND ACETAMINOPHEN 1 TABLET: 7.5; 325 TABLET ORAL at 08:03

## 2019-07-08 RX ADMIN — Medication 5 ML: at 12:18

## 2019-07-08 RX ADMIN — IBUPROFEN 600 MG: 600 TABLET ORAL at 01:25

## 2019-07-08 RX ADMIN — DOCUSATE SODIUM 100 MG: 100 CAPSULE, LIQUID FILLED ORAL at 08:53

## 2019-07-08 RX ADMIN — OXYCODONE HYDROCHLORIDE AND ACETAMINOPHEN 1 TABLET: 7.5; 325 TABLET ORAL at 00:23

## 2019-07-08 RX ADMIN — DOCUSATE SODIUM 100 MG: 100 CAPSULE, LIQUID FILLED ORAL at 20:27

## 2019-07-08 RX ADMIN — IBUPROFEN 600 MG: 600 TABLET ORAL at 10:11

## 2019-07-08 NOTE — PROGRESS NOTES
CC: SAMANTHA visit    Patient Active Problem List   Diagnosis   • History of migraine headaches   • Nausea and vomiting during pregnancy prior to 22 weeks gestation   • Class 1 obesity due to excess calories with serious comorbidity and body mass index (BMI) of 34.0 to 34.9 in adult   • Abnormal echocardiogram   • Hypertension during pregnancy, antepartum   • 36 weeks gestation of pregnancy   • NST (non-stress test) with decelerations       HPI: Here for follow up prenatal care.     Labs:   No results found for: HGBA1C  Glucose   Date Value Ref Range Status   2019 93 65 - 99 mg/dL Final   2019 118 (H) 65 - 99 mg/dL Final   2019 91 65 - 99 mg/dL Final     Last Completed Pap Smear       Status Date      PAP SMEAR Done 2019 LIQUID-BASED PAP SMEAR, SCREENING          Radiology:    Each of the above were reviewed and integrated into prenatal care plan.    P/E:  See Vitals flowsheet    A/P: 23 y.o. #: 1, Date: , Sex: None, Weight: None, GA: 7w1d, Delivery: Spontaneous , Apgar1: None, Apgar5: None, Living: None, Birth Comments: SAB    #: 2, Date: None, Sex: None, Weight: None, GA: None, Delivery: None, Apgar1: None, Apgar5: None, Living: None, Birth Comments: None      1. Routine prenatal care   Encourage PNV   PTL precautions     2.    Diagnosis Plan   1. Gestational hypertension without significant proteinuria in third trimester  CBC (No Diff) anna timing of delivery current A.C.O.G. recommendations 37 weeks reviewed at length questions answered    Comprehensive Metabolic Panel    Protein / Creatinine Ratio, Urine - Urine, Clean Catch   2. 36 weeks gestation of pregnancy     3. Class 1 obesity due to excess calories with serious comorbidity and body mass index (BMI) of 34.0 to 34.9 in adult     4. Edema during pregnancy in third trimester

## 2019-07-08 NOTE — OP NOTE
Section Procedure Note    Valente Brown    2017     Indications: .  Failed induction; prolonged rupture of membranes (28 hours); gestational hypertension    Pre-operative Diagnosis: Intrauterine pregnancy at 38-5/7 weeks; failed induction; prolonged rupture membranes (28 hours); chorioamnionitis; gestational hypertension; abnormal fetal well-being testing    Post-operative Diagnosis: Same in addition asynclitic presentation of head with large amount of Caput molding    PROCEDURES PERFORMED: Procedure(s):   SECTION PRIMARY with low transverse uterine incision    SURGEON: Michele Cornejo MD, FACOG         STAFF:   Circulator: Karis Jolley RN  Scrub Person: Breann Marie  L & GIOVANI Nurse: Zayra Burgess RN; Aruna Arguello RN  Assistant: Laura Rooney CSA    ANESTHESIA: Spinal    ANESTHESIA STAFF:  CRNA: Jesús Silver CRNA    Findings: Uterus tubes and ovaries appeared normal preoperatively there was a single infant wedged in the mid pelvis on delivery it became clear that the Foot was not over the vertex but was displaced consistent with an asynclitic  presentation    Birth Information  YOB: 2017   Time of birth: 12:38 PM   Delivering clinician: Michele Cornejo   Sex: male   Delivery type: , Low Transverse   Breech type (if applicable):     Observed anomalies/comments:         Estimated Blood Loss:  600           Drains: Renae                 Specimens: Placenta                Complications:  None; patient tolerated the procedure well.           Disposition: Mother Baby Unit           Condition: stable    Procedure Details   After satisfactory regional anesthesia had been obtained patient was prepped and draped in supine position with left uterine displacement.  The anesthetic was tested and found to be adequate.  A Pfannenstiel incision was made and carried down the fascia.  The fascia was incised laterally and superiorly on both sides. Fascia  was dissected off the linea alba for cephalad and then caudally.  Rectus muscles were divided with blunt dissection.  Peritoneum grasped between 2 hemostats and incised.  A bladder flap created from the visceral peritoneum.  Uterus incised in low transverse manner and incision extended with 's fingers in a low transverse manner using cephalad and caudal traction.  Single infant delivered from vertex presentation without difficulty.  Cord blood was obtained for usual studies.  Placenta delivered spontaneously appeared intact.  Uterus extra-abdominalized, checked for retained products of conception, none found.  The hysterotomy incision closed with 0 chromic with good approximation and hemostasis.  Next an imbricating layer of 0 chromic.  Bleeding points made hemostatic with 2-0 chromic.  Sponge, instrument, needle count correct.  The peritoneum closed 2-0 Vicryl running fashion.  Meticulous subfascial hemostasis obtained.  Sponge, instrument, needle count correct.  Fascia closed with 0 Vicryl in a running fashion.  Good approximation visually and to palpation and no evidence of leakage.  Meticulous subcutaneous hemostasis obtained.  Sponge, instrument, needle count correct.  Sebastian's and fatty tissue closed with 0 plain.  Skin closed 4-0 Monocryl subcutaneous.  Mastisol Steri-Strips applied.  Patient taken recovery room in good condition.            This document has been electronically signed by Michele Cornejo MD on July 7, 2019 7:02 PM

## 2019-07-08 NOTE — PROGRESS NOTES
[facility]  Valente Brown  : 1995  MRN: [id number]  CSN: [id number]    Post-operative Day #1  Subjective   Her pain is well controlled.  Vaginal bleeding is appropriate amount.  She is not passing gas and has had a bowel movement. She developed a fever of 101 F last night that has decreased to 98 F now. There is concern for potential chorioamnionitis, blood cultures pending.     Objective     Min/max vitals past 24 hours:   Temp  Min: 98 °F (36.7 °C)  Max: 101 °F (38.3 °C)  BP  Min: 78/35  Max: 137/72  Pulse  Min: 86  Max: 155  Pulse  Min: 86  Max: 155        General: well developed; well nourished  no acute distress   Abdomen: incision is covered by bandage, clean and tender  Normal findings: nondistended   Pelvic: Not performed   Ext: Calves NT     Lab Results   Component Value Date    WBC 9.77 2019    HGB 13.0 2019    HCT 37.4 2019    MCV 83.3 2019     2019    RH Positive 2019    HEPBSAG Negative 2019        Assessment   1. POD #1 S/P Primary LTCS   2. Potential endometritis      Plan   1. Continue routine postpartum care   2. Continue Zosyn IV q8h for potential chorioamnionitis          This document has been electronically signed by Samira Gates MD on 2019 7:44 AM    I have seen and evaluated the patient.  I have discussed the case with the resident. I have reviewed the notes, assessment and plan, and/or procedures performed by the resident. I concur with the resident’s documentation.        This document has been electronically signed by Michele Cornejo MD on 2019 8:44 PM

## 2019-07-08 NOTE — PLAN OF CARE
Problem: Patient Care Overview  Goal: Plan of Care Review  Outcome: Ongoing (interventions implemented as appropriate)   19 0601   Coping/Psychosocial   Plan of Care Reviewed With patient   Plan of Care Review   Progress improving   OTHER   Outcome Summary VSS. Voids, ambulating in room, passing flatus. Fundus firm. Pt c/o of pain but refusing Percocet at this time. States it is making her too sleepy. Breast pump utilized twice tonight.        Problem: Anesthesia/Analgesia, Neuraxial (Obstetrics)  Goal: Signs and Symptoms of Listed Potential Problems Will be Absent, Minimized or Managed (Anesthesia/Analgesia, Neuraxial)  Outcome: Outcome(s) achieved Date Met: 19      Problem: Postpartum ( Delivery) (Adult,Obstetrics,Pediatric)  Goal: Signs and Symptoms of Listed Potential Problems Will be Absent, Minimized or Managed (Postpartum)  Outcome: Ongoing (interventions implemented as appropriate)    Goal: Anesthesia/Sedation Recovery  Outcome: Ongoing (interventions implemented as appropriate)      Problem: Fall Risk (Adult)  Goal: Identify Related Risk Factors and Signs and Symptoms  Outcome: Ongoing (interventions implemented as appropriate)      Problem: Skin Injury Risk (Adult)  Goal: Identify Related Risk Factors and Signs and Symptoms  Outcome: Outcome(s) achieved Date Met: 19    Goal: Skin Health and Integrity  Outcome: Outcome(s) achieved Date Met: 19

## 2019-07-09 LAB
LAB AP CASE REPORT: NORMAL
PATH REPORT.FINAL DX SPEC: NORMAL
PATH REPORT.GROSS SPEC: NORMAL

## 2019-07-09 PROCEDURE — 94799 UNLISTED PULMONARY SVC/PX: CPT

## 2019-07-09 RX ORDER — OXYCODONE HYDROCHLORIDE AND ACETAMINOPHEN 5; 325 MG/1; MG/1
1 TABLET ORAL EVERY 4 HOURS PRN
Status: DISCONTINUED | OUTPATIENT
Start: 2019-07-09 | End: 2019-07-10 | Stop reason: HOSPADM

## 2019-07-09 RX ADMIN — IBUPROFEN 600 MG: 600 TABLET ORAL at 13:48

## 2019-07-09 RX ADMIN — DOCUSATE SODIUM 100 MG: 100 CAPSULE, LIQUID FILLED ORAL at 20:12

## 2019-07-09 RX ADMIN — PRENATAL VIT W/ FE FUMARATE-FA TAB 27-0.8 MG 1 TABLET: 27-0.8 TAB at 08:16

## 2019-07-09 RX ADMIN — POLYETHYLENE GLYCOL 3350 17 G: 17 POWDER, FOR SOLUTION ORAL at 10:34

## 2019-07-09 RX ADMIN — PANTOPRAZOLE SODIUM 40 MG: 40 TABLET, DELAYED RELEASE ORAL at 08:16

## 2019-07-09 RX ADMIN — AMOXICILLIN AND CLAVULANATE POTASSIUM 1 TABLET: 875; 125 TABLET, FILM COATED ORAL at 20:12

## 2019-07-09 RX ADMIN — IBUPROFEN 600 MG: 600 TABLET ORAL at 20:12

## 2019-07-09 RX ADMIN — OXYCODONE HYDROCHLORIDE AND ACETAMINOPHEN 1 TABLET: 5; 325 TABLET ORAL at 08:23

## 2019-07-09 RX ADMIN — DOCUSATE SODIUM 100 MG: 100 CAPSULE, LIQUID FILLED ORAL at 08:16

## 2019-07-09 RX ADMIN — AMOXICILLIN AND CLAVULANATE POTASSIUM 1 TABLET: 875; 125 TABLET, FILM COATED ORAL at 08:19

## 2019-07-09 NOTE — PLAN OF CARE
Problem: Postpartum ( Delivery) (Adult,Obstetrics,Pediatric)  Goal: Anesthesia/Sedation Recovery  Outcome: Outcome(s) achieved Date Met: 19

## 2019-07-09 NOTE — PLAN OF CARE
Problem: Patient Care Overview  Goal: Plan of Care Review  Outcome: Ongoing (interventions implemented as appropriate)   19 9151   Coping/Psychosocial   Plan of Care Reviewed With patient   Plan of Care Review   Progress improving   OTHER   Outcome Summary VSS, ambulating to NICU, breast pumping, po pain meds for pain, fundus firm, passing gas      Goal: Individualization and Mutuality  Outcome: Ongoing (interventions implemented as appropriate)    Goal: Discharge Needs Assessment  Outcome: Ongoing (interventions implemented as appropriate)    Goal: Interprofessional Rounds/Family Conf  Outcome: Ongoing (interventions implemented as appropriate)      Problem: Postpartum ( Delivery) (Adult,Obstetrics,Pediatric)  Goal: Signs and Symptoms of Listed Potential Problems Will be Absent, Minimized or Managed (Postpartum)  Outcome: Ongoing (interventions implemented as appropriate)    Goal: Anesthesia/Sedation Recovery  Outcome: Ongoing (interventions implemented as appropriate)

## 2019-07-09 NOTE — PROGRESS NOTES
[facility]  Valente Brown  : 1995  MRN: [id number]  CSN: [id number]    Post-operative Day #2  Subjective   Her pain is well controlled.  Vaginal bleeding is appropriate amount.  She is passing gas and has not had a bowel movement. She is able to urinate. She is able to ambulate with only mild discomfort.      Objective     Min/max vitals past 24 hours:   Temp  Min: 97.9 °F (36.6 °C)  Max: 99 °F (37.2 °C)  BP  Min: 102/49  Max: 129/59  Pulse  Min: 98  Max: 118  Pulse  Min: 98  Max: 118        General: Well developed; Well nourished  No acute distress   Abdomen: soft, non-tender; no masses  incision is clean, dry, intact and without drainage   Pelvic: Not performed   Ext: Calves NT     Lab Results   Component Value Date    WBC 11.84 (H) 2019    HGB 10.2 (L) 2019    HCT 30.0 (L) 2019    MCV 84.5 2019     (L) 2019    RH Positive 2019    HEPBSAG Negative 2019        Assessment   1. POD #2 S/P Primary LTCS   2. Suspected chorioamnioitis     Plan   1. Continue routine postpartum care   2. Switched to Augmentin 875-125 mg [id number] IV access- for suspected chorioamnioitis          This document has been electronically signed by Samira Gates MD on 2019 7:37 AM   I have seen and evaluated the patient.  I have discussed the case with the resident. I have reviewed the notes, assessment and plan, and/or procedures performed by the resident. I concur with the resident’s documentation.        This document has been electronically signed by Michele Cornejo MD on 2019 8:01 PM

## 2019-07-09 NOTE — PLAN OF CARE
Problem: Patient Care Overview  Goal: Plan of Care Review  Outcome: Ongoing (interventions implemented as appropriate)   07/08/19 1921   Coping/Psychosocial   Plan of Care Reviewed With patient   Plan of Care Review   Progress improving   OTHER   Outcome Summary vss, showered, ambulated ,pumping breast, po pain meds     Goal: Individualization and Mutuality  Outcome: Ongoing (interventions implemented as appropriate)    Goal: Discharge Needs Assessment  Outcome: Ongoing (interventions implemented as appropriate)    Goal: Interprofessional Rounds/Family Conf  Outcome: Ongoing (interventions implemented as appropriate)      Problem: Fall Risk (Adult)  Goal: Identify Related Risk Factors and Signs and Symptoms  Outcome: Ongoing (interventions implemented as appropriate)    Goal: Absence of Fall  Outcome: Ongoing (interventions implemented as appropriate)

## 2019-07-10 VITALS
HEIGHT: 69 IN | RESPIRATION RATE: 18 BRPM | OXYGEN SATURATION: 98 % | HEART RATE: 103 BPM | SYSTOLIC BLOOD PRESSURE: 127 MMHG | DIASTOLIC BLOOD PRESSURE: 59 MMHG | WEIGHT: 248 LBS | TEMPERATURE: 98.3 F | BODY MASS INDEX: 36.73 KG/M2

## 2019-07-10 PROBLEM — O28.8 NST (NON-STRESS TEST) WITH DECELERATIONS: Status: RESOLVED | Noted: 2019-07-07 | Resolved: 2019-07-10

## 2019-07-10 PROBLEM — Z3A.36 36 WEEKS GESTATION OF PREGNANCY: Status: RESOLVED | Noted: 2019-07-07 | Resolved: 2019-07-10

## 2019-07-10 PROBLEM — O21.9 NAUSEA AND VOMITING DURING PREGNANCY PRIOR TO 22 WEEKS GESTATION: Status: RESOLVED | Noted: 2019-01-30 | Resolved: 2019-07-10

## 2019-07-10 PROBLEM — O16.9 HYPERTENSION DURING PREGNANCY, ANTEPARTUM: Status: RESOLVED | Noted: 2019-07-07 | Resolved: 2019-07-10

## 2019-07-10 LAB
BACTERIA SPEC AEROBE CULT: NORMAL
GRAM STN SPEC: NORMAL
GRAM STN SPEC: NORMAL

## 2019-07-10 RX ORDER — OXYCODONE HYDROCHLORIDE AND ACETAMINOPHEN 5; 325 MG/1; MG/1
1 TABLET ORAL EVERY 4 HOURS PRN
Qty: 18 TABLET | Refills: 0 | OUTPATIENT
Start: 2019-07-10 | End: 2019-07-19

## 2019-07-10 RX ORDER — HYDROCODONE BITARTRATE AND ACETAMINOPHEN 7.5; 325 MG/1; MG/1
1 TABLET ORAL EVERY 4 HOURS PRN
Qty: 18 TABLET | Refills: 0 | Status: SHIPPED | OUTPATIENT
Start: 2019-07-10 | End: 2019-07-31

## 2019-07-10 RX ORDER — AMOXICILLIN AND CLAVULANATE POTASSIUM 875; 125 MG/1; MG/1
1 TABLET, FILM COATED ORAL EVERY 12 HOURS SCHEDULED
Qty: 16 TABLET | Refills: 0 | Status: SHIPPED | OUTPATIENT
Start: 2019-07-10 | End: 2019-07-18

## 2019-07-10 RX ORDER — IBUPROFEN 600 MG/1
600 TABLET ORAL EVERY 6 HOURS PRN
Qty: 120 TABLET | Refills: 0 | OUTPATIENT
Start: 2019-07-10

## 2019-07-10 RX ORDER — AMOXICILLIN AND CLAVULANATE POTASSIUM 875; 125 MG/1; MG/1
1 TABLET, FILM COATED ORAL EVERY 12 HOURS SCHEDULED
Qty: 17 TABLET | Refills: 0 | OUTPATIENT
Start: 2019-07-10 | End: 2019-07-19

## 2019-07-10 RX ADMIN — IBUPROFEN 600 MG: 600 TABLET ORAL at 11:55

## 2019-07-10 RX ADMIN — AMOXICILLIN AND CLAVULANATE POTASSIUM 1 TABLET: 875; 125 TABLET, FILM COATED ORAL at 09:05

## 2019-07-10 RX ADMIN — DOCUSATE SODIUM 100 MG: 100 CAPSULE, LIQUID FILLED ORAL at 09:05

## 2019-07-10 RX ADMIN — IBUPROFEN 600 MG: 600 TABLET ORAL at 04:01

## 2019-07-10 RX ADMIN — PRENATAL VIT W/ FE FUMARATE-FA TAB 27-0.8 MG 1 TABLET: 27-0.8 TAB at 09:05

## 2019-07-10 RX ADMIN — PANTOPRAZOLE SODIUM 40 MG: 40 TABLET, DELAYED RELEASE ORAL at 07:35

## 2019-07-10 NOTE — DISCHARGE SUMMARY
[facility]  Valente Brown  : 1995  MRN: [id number]  CSN: [id number]    Discharge Summary      Date of Admission: 2019   Date of Discharge:  7/10/2019   Principle Discharge Dx: 1.  delivery   Procedures Performed: Procedure(s):   SECTION PRIMARY   Brief History: Patient is a 23 y.o. now  who presented to labor and delivery at 36w3d from an office visit due to a late deceleration on NST in the presence of gestational hypertension. Follow up fetal heart monitoring was reassuring- category 1. Patient agreed to induction of labor and delivery given gestational age, presence of gestational hypertension, and BPP of 6/8.  After induction by cervical balloon, she progressed slowly. She dilated to 6 cm dilation 29 hours after rupture of membrane with augmentation by Pitocin. On 2019, due to fetal tachycardia and maternal tachycardia with suspected intra-amniotic infection, an emergent  was performed due to failure to progress.   Hospital Course: Her post-operative course was unremarkable except for suspected chorioamnionitis. She was started on IV Zosyn but switched to PO Augmentin due to poor IV access.  On POD # 2 she expressed the desire for discharge. She had no febrile morbidity. She had passed gas, and was urinating normally. She was eating a regular diet without difficulty. She was ambulating well. Her incision was clean, dry and intact. Discharge instructions were given.  All questions were answered   Pending Studies:   Diet:  Discharge Condition: No  Normal  Stable   Discharge Activity: Vaginal rest for 6 weeks.  Other activity as tolerated.   Discharge Medications:    Your medication list      START taking these medications      Instructions Last Dose Given Next Dose Due   amoxicillin-clavulanate 875-125 MG per tablet  Commonly known as:  AUGMENTIN      Take 1 tablet by mouth Every 12 (Twelve) Hours for 16 doses.       HYDROcodone-acetaminophen  7.5-325 MG per tablet  Commonly known as:  NORCO      Take 1 tablet by mouth Every 4 (Four) Hours As Needed for Moderate Pain  for up to 18 doses.          CONTINUE taking these medications      Instructions Last Dose Given Next Dose Due   calcium carbonate 500 MG chewable tablet  Commonly known as:  TUMS      Chew 2 tablets As Needed for Indigestion or Heartburn.       omeprazole 40 MG capsule  Commonly known as:  PRILOSEC      Take 1 capsule by mouth Daily.       ondansetron ODT 8 MG disintegrating tablet  Commonly known as:  ZOFRAN ODT      Take 1 tablet by mouth Every 8 (Eight) Hours As Needed for Nausea or Vomiting.       Prenatal Vitamin 27-0.8 MG tablet      Take 1 tablet by mouth Daily.             Where to Get Your Medications      These medications were sent to [facility] [address] [place] [address] - [phone] [facility] [phone] [address] [address]    Phone:  [phone] ·   amoxicillin-clavulanate 875-125 MG per tablet     You can get these medications from any pharmacy    Bring a paper prescription for each of these medications  · HYDROcodone-acetaminophen 7.5-325 MG per tablet        Discharge Disposition: home health     Follow-up: Future Appointments   Date Time Provider Department Center   8/16/2019  9:00 AM JASWINDER [facility] ECHO ROOM 1 [facility]   8/19/2019 11:00 AM Simeon Luther MD PhD [facility] CD JASWINDER None     F/U with Dr. Cornejo on 7/15/2019       This note has been electronically signed.        This document has been electronically signed by Samira Gates MD on July 10, 2019 11:23 AM

## 2019-07-10 NOTE — PLAN OF CARE
Problem: Patient Care Overview  Goal: Plan of Care Review  Outcome: Ongoing (interventions implemented as appropriate)   07/10/19 6020   Coping/Psychosocial   Plan of Care Reviewed With patient   Plan of Care Review   Progress improving   OTHER   Outcome Summary VSS, FF lochia light, pain controlled with PRN Motrin, passing flatus and ambulating in hallway     Goal: Individualization and Mutuality  Outcome: Ongoing (interventions implemented as appropriate)    Goal: Discharge Needs Assessment  Outcome: Ongoing (interventions implemented as appropriate)    Goal: Interprofessional Rounds/Family Conf  Outcome: Ongoing (interventions implemented as appropriate)      Problem: Postpartum ( Delivery) (Adult,Obstetrics,Pediatric)  Goal: Signs and Symptoms of Listed Potential Problems Will be Absent, Minimized or Managed (Postpartum)  Outcome: Ongoing (interventions implemented as appropriate)

## 2019-07-15 ENCOUNTER — OFFICE VISIT (OUTPATIENT)
Dept: OBSTETRICS AND GYNECOLOGY | Facility: CLINIC | Age: 24
End: 2019-07-15

## 2019-07-15 VITALS
BODY MASS INDEX: 33.59 KG/M2 | SYSTOLIC BLOOD PRESSURE: 136 MMHG | HEIGHT: 69 IN | DIASTOLIC BLOOD PRESSURE: 70 MMHG | WEIGHT: 226.8 LBS

## 2019-07-15 DIAGNOSIS — Z98.890 POSTOPERATIVE STATE: Primary | ICD-10-CM

## 2019-07-15 DIAGNOSIS — O13.3 GESTATIONAL HYPERTENSION WITHOUT SIGNIFICANT PROTEINURIA IN THIRD TRIMESTER: ICD-10-CM

## 2019-07-15 PROCEDURE — 0503F POSTPARTUM CARE VISIT: CPT | Performed by: OBSTETRICS & GYNECOLOGY

## 2019-07-15 RX ORDER — IBUPROFEN 600 MG/1
600 TABLET ORAL EVERY 6 HOURS PRN
COMMUNITY
End: 2019-07-31

## 2019-07-15 NOTE — PROGRESS NOTES
Valente Brown is a 23 y.o. y/o female.     Chief Complaint: Patient is postop     HPI:   23 y.o. .  Patient's last menstrual period was 10/23/2018 (exact date)..  Postop  doing well no complaints normal bowel and bladder function          Review of Systems   ROS:  CNS: No history of brain malignancy.  HEENT: No history of throat cancer.  Eye: No history of retinal cancer.  Pulmonary: No history of lung cancer.                                                                                 Cardiovascular: No history of cardiac tumors.  Gastrointestinal: No history of small bowel tumors.  Renal: No history of kidney malignancy.  Musculoskeletal: No history of smooth muscle tumors.  Lymphatics: No history of Hodgkin's disease.  Endocrine: No history of thyroid malignancy.    The following portions of the patient's history were reviewed and updated as appropriate: allergies, current medications, past family history, past medical history, past social history, past surgical history and problem list.    No Known Allergies     Outpatient Medications Prior to Visit   Medication Sig Dispense Refill   • amoxicillin-clavulanate (AUGMENTIN) 875-125 MG per tablet Take 1 tablet by mouth Every 12 (Twelve) Hours for 16 doses. 16 tablet 0   • ibuprofen (ADVIL,MOTRIN) 600 MG tablet Take 600 mg by mouth Every 6 (Six) Hours As Needed for Mild Pain .     • Prenatal Vit-Fe Fumarate-FA (PRENATAL VITAMIN) 27-0.8 MG tablet Take 1 tablet by mouth Daily.     • calcium carbonate (TUMS) 500 MG chewable tablet Chew 2 tablets As Needed for Indigestion or Heartburn.     • HYDROcodone-acetaminophen (NORCO) 7.5-325 MG per tablet Take 1 tablet by mouth Every 4 (Four) Hours As Needed for Moderate Pain  for up to 18 doses. 18 tablet 0   • omeprazole (PRILOSEC) 40 MG capsule Take 1 capsule by mouth Daily. 30 capsule 12   • ondansetron ODT (ZOFRAN ODT) 8 MG disintegrating tablet Take 1 tablet by mouth Every 8 (Eight)  Hours As Needed for Nausea or Vomiting. 20 tablet 3     No facility-administered medications prior to visit.         The patient has a family history of   Family History   Problem Relation Age of Onset   • No Known Problems Father    • No Known Problems Mother    • No Known Problems Brother    • No Known Problems Sister    • Breast cancer Paternal Grandfather    • No Known Problems Brother    • No Known Problems Brother    • No Known Problems Brother    • No Known Problems Sister         Past Medical History:   Diagnosis Date   • Gestational diabetes 2019   • Hypertension in pregnancy 2019   • Migraine    • PONV (postoperative nausea and vomiting)    • Varicella     vaccine        OB History      Para Term  AB Living    2 0     1 0    SAB TAB Ectopic Molar Multiple Live Births    1         0           Social History     Socioeconomic History   • Marital status:      Spouse name: Not on file   • Number of children: Not on file   • Years of education: Not on file   • Highest education level: Not on file   Tobacco Use   • Smoking status: Never Smoker   • Smokeless tobacco: Never Used   Substance and Sexual Activity   • Alcohol use: No     Frequency: Never   • Drug use: No   • Sexual activity: Yes     Partners: Male     Birth control/protection: None        Past Surgical History:   Procedure Laterality Date   •  SECTION N/A 2019    Procedure:  SECTION PRIMARY;  Surgeon: Michele Cornejo MD;  Location: [facility] LABOR DELIVERY;  Service: Obstetrics/Gynecology   • KNEE ARTHROSCOPY Right    • LAPAROSCOPIC CHOLECYSTECTOMY  2016   • WISDOM TOOTH EXTRACTION          Patient Active Problem List   Diagnosis   • History of migraine headaches   • Class 1 obesity due to excess calories with serious comorbidity and body mass index (BMI) of 34.0 to 34.9 in adult   • Abnormal echocardiogram        Documented Vitals    07/15/19 1454   BP: 136/70   Weight: 103 kg (226 lb 12.8 oz)  "  Height: 175.3 cm (69\")   PainSc:   3   PainLoc: Incisional        Body mass index is 33.49 kg/m².    Physical Exam  Constitutional: Appears to be in no acute distress; Eyes: sclera normal; Endocrine system: thyroid palpate is normal; Pulmonary system: lungs clear; Cardiovascular system: heart regular rate and rhythm; Gastrointestinal system: abdomen soft nontender, active bowel sounds; Urologic system: CVA negative; Psychiatric: appropriate insight; Neurologic: gait within normal limits incision appears to be healing well    Laboratory Data:   Lab Results - Last 18 Months   Lab Units 07/05/19  1559 07/01/19  1305 06/28/19  1126 06/26/19  0629 06/25/19  1422   GLUCOSE mg/dL 93 118* 91 97 88   BUN mg/dL 6 6 8 8 8   CREATININE mg/dL 0.62 0.56* 0.51* 0.51* 0.63   SODIUM mmol/L 138 136 140 137 140   POTASSIUM mmol/L 3.6 3.9 4.3 4.2 4.2   CHLORIDE mmol/L 104 106 110* 105 104   CO2 mmol/L 22.0 18.5* 17.7* 20.0* 23.0   CALCIUM mg/dL 8.8 9.5 9.4 8.8 9.2   TOTAL PROTEIN g/dL 6.2 6.7 6.4 5.8* 6.6   ALBUMIN g/dL 3.30* 3.70 3.60 3.10* 3.60   ALT (SGPT) U/L 9 7 5 6 6   AST (SGOT) U/L 14 13 11 11 14   ALK PHOS U/L 176* 179* 176* 148* 167*   BILIRUBIN mg/dL 0.2 0.2 0.2 0.2 0.3   EGFR IF NONAFRICN AM mL/min/1.73 122 137 >150 >150 119   GLOBULIN gm/dL 2.9 3.0 2.8 2.7 3.0   A/G RATIO g/dL 1.1 1.2 1.3 1.1 1.2   BUN / CREAT RATIO  9.7 10.7 15.7 15.7 12.7   ANION GAP mmol/L 12.0 11.5 12.3 12.0 13.0     Lab Results - Last 18 Months   Lab Units 07/08/19  0855 07/05/19  1812 07/05/19  1559 07/01/19  1305 06/28/19  1126   WBC 10*3/mm3 11.84* 9.77 9.78 9.70 9.03   RBC 10*6/mm3 3.55* 4.49 4.18 4.50 4.63   HEMOGLOBIN g/dL 10.2* 13.0 12.0 13.1 13.5   HEMATOCRIT % 30.0* 37.4 35.1 38.8 39.2   MCV fL 84.5 83.3 84.0 86.2 84.7   MCH pg 28.7 29.0 28.7 29.1 29.2   MCHC g/dL 34.0 34.8 34.2 33.8 34.4   RDW % 12.5 12.0* 12.0* 12.1* 11.8*   RDW-SD fl 37.9 36.0* 36.2* 37.7 35.7*   MPV fL 12.4* 12.9* 12.7* 13.2* 13.6*   PLATELETS 10*3/mm3 136* 169 172 186 " 204     No results for input(s): HCGQUAL in the last 97659 hours.    Assessment        Diagnosis Plan   1. Postoperative state           Plan       No orders of the defined types were placed in this encounter.            This document has been electronically signed by Michele Cornejo MD on July 15, 2019 3:41 PM

## 2019-07-17 LAB
BACTERIA SPEC ANAEROBE CULT: NORMAL
GRAM STN SPEC: NORMAL
GRAM STN SPEC: NORMAL

## 2019-07-31 ENCOUNTER — OFFICE VISIT (OUTPATIENT)
Dept: OBSTETRICS AND GYNECOLOGY | Facility: CLINIC | Age: 24
End: 2019-07-31

## 2019-07-31 VITALS
HEIGHT: 69 IN | BODY MASS INDEX: 31.84 KG/M2 | DIASTOLIC BLOOD PRESSURE: 86 MMHG | SYSTOLIC BLOOD PRESSURE: 138 MMHG | WEIGHT: 215 LBS

## 2019-07-31 DIAGNOSIS — Z98.890 POSTOPERATIVE STATE: Primary | ICD-10-CM

## 2019-07-31 PROCEDURE — 0503F POSTPARTUM CARE VISIT: CPT | Performed by: OBSTETRICS & GYNECOLOGY

## 2019-07-31 RX ORDER — ACETAMINOPHEN AND CODEINE PHOSPHATE 120; 12 MG/5ML; MG/5ML
1 SOLUTION ORAL DAILY
Qty: 28 TABLET | Refills: 12 | Status: SHIPPED | OUTPATIENT
Start: 2019-07-31 | End: 2020-07-30

## 2019-07-31 NOTE — PROGRESS NOTES
Valente Brown is a 23 y.o. y/o female.     Chief Complaint: Postop     HPI:   23 y.o. .  Patient's last menstrual period was 10/23/2018 (exact date)..  Patient postop  doing well without complaints          Review of Systems   ROS:  CNS: No history of brain malignancy.  HEENT: No history of throat cancer.  Eye: No history of retinal cancer.  Pulmonary: No history of lung cancer.                                                                                 Cardiovascular: No history of cardiac tumors.  Gastrointestinal: No history of small bowel tumors.  Renal: No history of kidney malignancy.  Musculoskeletal: No history of smooth muscle tumors.  Lymphatics: No history of Hodgkin's disease.  Endocrine: No history of thyroid malignancy.    The following portions of the patient's history were reviewed and updated as appropriate: allergies, current medications, past family history, past medical history, past social history, past surgical history and problem list.    No Known Allergies     Outpatient Medications Prior to Visit   Medication Sig Dispense Refill   • calcium carbonate (TUMS) 500 MG chewable tablet Chew 2 tablets As Needed for Indigestion or Heartburn.     • HYDROcodone-acetaminophen (NORCO) 7.5-325 MG per tablet Take 1 tablet by mouth Every 4 (Four) Hours As Needed for Moderate Pain  for up to 18 doses. 18 tablet 0   • ibuprofen (ADVIL,MOTRIN) 600 MG tablet Take 600 mg by mouth Every 6 (Six) Hours As Needed for Mild Pain .     • omeprazole (PRILOSEC) 40 MG capsule Take 1 capsule by mouth Daily. 30 capsule 12   • ondansetron ODT (ZOFRAN ODT) 8 MG disintegrating tablet Take 1 tablet by mouth Every 8 (Eight) Hours As Needed for Nausea or Vomiting. 20 tablet 3   • Prenatal Vit-Fe Fumarate-FA (PRENATAL VITAMIN) 27-0.8 MG tablet Take 1 tablet by mouth Daily.       No facility-administered medications prior to visit.         The patient has a family history of   Family History  "  Problem Relation Age of Onset   • No Known Problems Father    • No Known Problems Mother    • No Known Problems Brother    • No Known Problems Sister    • Breast cancer Paternal Grandfather    • No Known Problems Brother    • No Known Problems Brother    • No Known Problems Brother    • No Known Problems Sister         Past Medical History:   Diagnosis Date   • Gestational diabetes 2019   • Hypertension in pregnancy 2019   • Migraine    • PONV (postoperative nausea and vomiting)    • Varicella     vaccine        OB History      Para Term  AB Living    2 0     1 0    SAB TAB Ectopic Molar Multiple Live Births    1         0           Social History     Socioeconomic History   • Marital status:      Spouse name: Not on file   • Number of children: Not on file   • Years of education: Not on file   • Highest education level: Not on file   Tobacco Use   • Smoking status: Never Smoker   • Smokeless tobacco: Never Used   Substance and Sexual Activity   • Alcohol use: No     Frequency: Never   • Drug use: No   • Sexual activity: Yes     Partners: Male     Birth control/protection: None        Past Surgical History:   Procedure Laterality Date   •  SECTION N/A 2019    Procedure:  SECTION PRIMARY;  Surgeon: Michele Cornejo MD;  Location: [facility] LABOR DELIVERY;  Service: Obstetrics/Gynecology   • KNEE ARTHROSCOPY Right    • LAPAROSCOPIC CHOLECYSTECTOMY  2016   • WISDOM TOOTH EXTRACTION          Patient Active Problem List   Diagnosis   • History of migraine headaches   • Class 1 obesity due to excess calories with serious comorbidity and body mass index (BMI) of 34.0 to 34.9 in adult   • Abnormal echocardiogram        Documented Vitals    19 1447   BP: 138/86   Weight: 97.5 kg (215 lb)   Height: 175.3 cm (69\")        Body mass index is 31.75 kg/m².    Physical Exam  Constitutional: Appears to be in no acute distress; Eyes: sclera normal; Endocrine system: thyroid " palpate is normal; Pulmonary system: lungs clear; Cardiovascular system: heart regular rate and rhythm; Gastrointestinal system: abdomen soft nontender, active bowel sounds; Urologic system: CVA negative; Psychiatric: appropriate insight; Neurologic: gait within normal limits patient incision appears to be healing well    Laboratory Data:   Lab Results - Last 18 Months   Lab Units 07/05/19  1559 07/01/19  1305 06/28/19  1126 06/26/19  0629 06/25/19  1422   GLUCOSE mg/dL 93 118* 91 97 88   BUN mg/dL 6 6 8 8 8   CREATININE mg/dL 0.62 0.56* 0.51* 0.51* 0.63   SODIUM mmol/L 138 136 140 137 140   POTASSIUM mmol/L 3.6 3.9 4.3 4.2 4.2   CHLORIDE mmol/L 104 106 110* 105 104   CO2 mmol/L 22.0 18.5* 17.7* 20.0* 23.0   CALCIUM mg/dL 8.8 9.5 9.4 8.8 9.2   TOTAL PROTEIN g/dL 6.2 6.7 6.4 5.8* 6.6   ALBUMIN g/dL 3.30* 3.70 3.60 3.10* 3.60   ALT (SGPT) U/L 9 7 5 6 6   AST (SGOT) U/L 14 13 11 11 14   ALK PHOS U/L 176* 179* 176* 148* 167*   BILIRUBIN mg/dL 0.2 0.2 0.2 0.2 0.3   EGFR IF NONAFRICN AM mL/min/1.73 122 137 >150 >150 119   GLOBULIN gm/dL 2.9 3.0 2.8 2.7 3.0   A/G RATIO g/dL 1.1 1.2 1.3 1.1 1.2   BUN / CREAT RATIO  9.7 10.7 15.7 15.7 12.7   ANION GAP mmol/L 12.0 11.5 12.3 12.0 13.0     Lab Results - Last 18 Months   Lab Units 07/08/19  0855 07/05/19  1812 07/05/19  1559 07/01/19  1305 06/28/19  1126   WBC 10*3/mm3 11.84* 9.77 9.78 9.70 9.03   RBC 10*6/mm3 3.55* 4.49 4.18 4.50 4.63   HEMOGLOBIN g/dL 10.2* 13.0 12.0 13.1 13.5   HEMATOCRIT % 30.0* 37.4 35.1 38.8 39.2   MCV fL 84.5 83.3 84.0 86.2 84.7   MCH pg 28.7 29.0 28.7 29.1 29.2   MCHC g/dL 34.0 34.8 34.2 33.8 34.4   RDW % 12.5 12.0* 12.0* 12.1* 11.8*   RDW-SD fl 37.9 36.0* 36.2* 37.7 35.7*   MPV fL 12.4* 12.9* 12.7* 13.2* 13.6*   PLATELETS 10*3/mm3 136* 169 172 186 204     No results for input(s): HCGQUAL in the last 78255 hours.    Assessment        Diagnosis Plan   1. Postoperative state           Plan         New Medications Ordered This Visit   Medications   •  norethindrone (MICRONOR) 0.35 MG tablet     Sig: Take 1 tablet by mouth Daily.     Dispense:  28 tablet     Refill:  12             This document has been electronically signed by Michele Cornejo MD on July 31, 2019 3:37 PM  EMR Dragon/Transcription disclaimer:   Some of this note may be an electronic transcription/translation of spoken language to printed text. The electronic translation of spoken language may permit erroneous, or at times, nonsensical words or phrases to be inadvertently transcribed; Although I have reviewed the note for such errors, some may still exist.

## 2019-08-16 LAB
BH CV ECHO MEAS - ACS: 2 CM
BH CV ECHO MEAS - AO MAX PG (FULL): 3.9 MMHG
BH CV ECHO MEAS - AO MAX PG: 5.8 MMHG
BH CV ECHO MEAS - AO ROOT AREA (BSA CORRECTED): 1.2
BH CV ECHO MEAS - AO ROOT AREA: 4.9 CM^2
BH CV ECHO MEAS - AO ROOT DIAM: 2.5 CM
BH CV ECHO MEAS - AO V2 MAX: 120 CM/SEC
BH CV ECHO MEAS - ASC AORTA: 2.5 CM
BH CV ECHO MEAS - AVA(V,A): 2 CM^2
BH CV ECHO MEAS - AVA(V,D): 2 CM^2
BH CV ECHO MEAS - BSA(HAYCOCK): 2.2 M^2
BH CV ECHO MEAS - BSA: 2.1 M^2
BH CV ECHO MEAS - BZI_BMI: 31.8 KILOGRAMS/M^2
BH CV ECHO MEAS - BZI_METRIC_HEIGHT: 175.3 CM
BH CV ECHO MEAS - BZI_METRIC_WEIGHT: 97.5 KG
BH CV ECHO MEAS - EDV(CUBED): 125 ML
BH CV ECHO MEAS - EDV(MOD-SP2): 63 ML
BH CV ECHO MEAS - EDV(MOD-SP4): 84 ML
BH CV ECHO MEAS - EDV(TEICH): 118.2 ML
BH CV ECHO MEAS - EF(CUBED): 68.6 %
BH CV ECHO MEAS - EF(MOD-SP2): 58.7 %
BH CV ECHO MEAS - EF(MOD-SP4): 44 %
BH CV ECHO MEAS - EF(TEICH): 59.9 %
BH CV ECHO MEAS - ESV(CUBED): 39.3 ML
BH CV ECHO MEAS - ESV(MOD-SP2): 26 ML
BH CV ECHO MEAS - ESV(MOD-SP4): 47 ML
BH CV ECHO MEAS - ESV(TEICH): 47.4 ML
BH CV ECHO MEAS - FS: 32 %
BH CV ECHO MEAS - IVS/LVPW: 1
BH CV ECHO MEAS - IVSD: 1 CM
BH CV ECHO MEAS - LA DIMENSION: 3.2 CM
BH CV ECHO MEAS - LA/AO: 1.3
BH CV ECHO MEAS - LV DIASTOLIC VOL/BSA (35-75): 39.4 ML/M^2
BH CV ECHO MEAS - LV MASS(C)D: 182 GRAMS
BH CV ECHO MEAS - LV MASS(C)DI: 85.4 GRAMS/M^2
BH CV ECHO MEAS - LV MAX PG: 1.9 MMHG
BH CV ECHO MEAS - LV MEAN PG: 1 MMHG
BH CV ECHO MEAS - LV SYSTOLIC VOL/BSA (12-30): 22.1 ML/M^2
BH CV ECHO MEAS - LV V1 MAX: 68.9 CM/SEC
BH CV ECHO MEAS - LV V1 MEAN: 42.6 CM/SEC
BH CV ECHO MEAS - LV V1 VTI: 14.8 CM
BH CV ECHO MEAS - LVIDD: 5 CM
BH CV ECHO MEAS - LVIDS: 3.4 CM
BH CV ECHO MEAS - LVLD AP2: 7.7 CM
BH CV ECHO MEAS - LVLD AP4: 8.3 CM
BH CV ECHO MEAS - LVLS AP2: 6.2 CM
BH CV ECHO MEAS - LVLS AP4: 6.3 CM
BH CV ECHO MEAS - LVOT AREA (M): 3.5 CM^2
BH CV ECHO MEAS - LVOT AREA: 3.5 CM^2
BH CV ECHO MEAS - LVOT DIAM: 2.1 CM
BH CV ECHO MEAS - LVPWD: 1 CM
BH CV ECHO MEAS - MV A MAX VEL: 67.9 CM/SEC
BH CV ECHO MEAS - MV DEC SLOPE: 318 CM/SEC^2
BH CV ECHO MEAS - MV E MAX VEL: 76.8 CM/SEC
BH CV ECHO MEAS - MV E/A: 1.1
BH CV ECHO MEAS - MV P1/2T MAX VEL: 88.6 CM/SEC
BH CV ECHO MEAS - MV P1/2T: 81.6 MSEC
BH CV ECHO MEAS - MVA P1/2T LCG: 2.5 CM^2
BH CV ECHO MEAS - MVA(P1/2T): 2.7 CM^2
BH CV ECHO MEAS - PA MAX PG: 2.5 MMHG
BH CV ECHO MEAS - PA V2 MAX: 78.7 CM/SEC
BH CV ECHO MEAS - RAP SYSTOLE: 5 MMHG
BH CV ECHO MEAS - RVDD: 2.7 CM
BH CV ECHO MEAS - RVSP: 16.3 MMHG
BH CV ECHO MEAS - SI(CUBED): 40.2 ML/M^2
BH CV ECHO MEAS - SI(LVOT): 24.1 ML/M^2
BH CV ECHO MEAS - SI(MOD-SP2): 17.4 ML/M^2
BH CV ECHO MEAS - SI(MOD-SP4): 17.4 ML/M^2
BH CV ECHO MEAS - SI(TEICH): 33.2 ML/M^2
BH CV ECHO MEAS - SV(CUBED): 85.7 ML
BH CV ECHO MEAS - SV(LVOT): 51.3 ML
BH CV ECHO MEAS - SV(MOD-SP2): 37 ML
BH CV ECHO MEAS - SV(MOD-SP4): 37 ML
BH CV ECHO MEAS - SV(TEICH): 70.8 ML
BH CV ECHO MEAS - TR MAX VEL: 168 CM/SEC
LV EF 2D ECHO EST: 53 %
MAXIMAL PREDICTED HEART RATE: 199 BPM
STRESS TARGET HR: 169 BPM

## 2019-08-19 ENCOUNTER — OFFICE VISIT (OUTPATIENT)
Dept: CARDIOLOGY | Facility: CLINIC | Age: 24
End: 2019-08-19

## 2019-08-19 VITALS
SYSTOLIC BLOOD PRESSURE: 116 MMHG | HEIGHT: 69 IN | HEART RATE: 93 BPM | OXYGEN SATURATION: 98 % | WEIGHT: 210.1 LBS | DIASTOLIC BLOOD PRESSURE: 80 MMHG | BODY MASS INDEX: 31.12 KG/M2

## 2019-08-19 DIAGNOSIS — E66.09 CLASS 1 OBESITY DUE TO EXCESS CALORIES WITH SERIOUS COMORBIDITY AND BODY MASS INDEX (BMI) OF 34.0 TO 34.9 IN ADULT: ICD-10-CM

## 2019-08-19 DIAGNOSIS — R93.1 ABNORMAL ECHOCARDIOGRAM: Primary | ICD-10-CM

## 2019-08-19 PROCEDURE — 99213 OFFICE O/P EST LOW 20 MIN: CPT | Performed by: INTERNAL MEDICINE

## 2019-08-19 NOTE — PROGRESS NOTES
Cardiovascular Medicine      Simeon Luther M.D., Ph.D., [facility]           History of Present Illness  This is a 23 y.o. female with:    1. LV EF: 49%  2. IUP with HTN    Valente Brown is a 23 y.o. female who presents in follow-up today.  She was found to have elevated blood pressures concerning for preeclampsia versus gestational diabetes.  She had been admitted with possible preeclampsia.  During her work-up a 2D TTE was ordered.  This showed a LVEF of 49%.  Obstetrics was concerned that this was a developing PPCM, so the patient was asked to follow-up with cardiology for further evaluation and management. When I saw her she had her second IUP. Her first child was a SAB around seven weeks. She was planning on a vaginal delivery, but she had to have a . Her deliver was 2019.  When I last saw her in consultation she was euvolemic and perfused.  No evidence of proteinuria.  She likely has developed gestational hypertension.  I went over the diagnostic criteria for PPCM, which she did not currently meet.  I could not exclude that she might be developing PPCM, so I asked her to have a close follow-up 2D echocardiogram.  This was performed prior to her appointment today.  She presents for results.  She continues to remain asymptomatic from a cardiovascular standpoint. Fortunately, her TTE was normal.     Review of Systems - Review of Systems   Cardiovascular: Negative.    Respiratory: Negative.         All other systems were reviewed and were negative.    family history includes Breast cancer in her paternal grandfather; No Known Problems in her brother, brother, brother, brother, father, mother, sister, and sister.     reports that she has never smoked. She has never used smokeless tobacco. She reports that she does not drink alcohol or use drugs.    No Known Allergies      Current Outpatient Medications:   •  norethindrone (MICRONOR) 0.35 MG tablet, Take 1 tablet by mouth Daily., Disp: 28  "tablet, Rfl: 12    Physical Exam:  Vitals:    08/19/19 1058   BP: 116/80   BP Location: Left arm   Patient Position: Sitting   Cuff Size: Adult   Pulse: 93   SpO2: 98%   Weight: 95.3 kg (210 lb 1.6 oz)   Height: 175.3 cm (69\")   PainSc:   2   PainLoc: Chest     Pulse Ox: Normal  on room air  General: alert, appears stated age and cooperative     Body Habitus: well-nourished    HEENT: Head: Normocephalic, no lesions, without obvious abnormality. No arcus senilis, xanthelasma or xanthomas.    JVP: Volume/Pulsation: Normal.  Normal waveforms.   Appropriate inspiratory decrease.  No Kussmaul's. No Ynes's.      Precordium: Normal impulses. P2 is not palpable.  RV Heave: absent  LV Heave: absent  [place]:  normal size and placement  Palpable S4: absent.  Heart rate: tachycardic    Heart Rhythm: regular     Heart Sounds: S1: normal intensity  S2: increased intensity  S3: absent   S4: absent  Opening Snap: absent    A2-OS:  no  Pericardial Rub:  Absent   Ejection click: None      Murmurs:  absent   Extremity: moves all extremities equally.   DATA REVIEWED:     TTE/VY:  Results for orders placed in visit on 07/05/19   Adult Transthoracic Echo Limited W/ Cont if Necessary Per Protocol    Narrative · The left ventricular cavity is borderline dilated.  · Estimated EF = 53%.  · Left ventricular systolic function is normal.  · Right ventricular cavity is borderline dilated. Normal systolic function  · No significant valve abnormalities               Lab Results   Component Value Date    GLUCOSE 93 07/05/2019    BUN 6 07/05/2019    CREATININE 0.62 07/05/2019    EGFRIFNONA 122 07/05/2019    BCR 9.7 07/05/2019    K 3.6 07/05/2019    CO2 22.0 07/05/2019    CALCIUM 8.8 07/05/2019    ALBUMIN 3.30 (L) 07/05/2019    AST 14 07/05/2019    ALT 9 07/05/2019     Lab Results   Component Value Date    WBC 11.84 (H) 07/08/2019    HGB 10.2 (L) 07/08/2019    HCT 30.0 (L) 07/08/2019    MCV 84.5 07/08/2019     (L) 07/08/2019     No results " "found for: CHOL, CHLPL, TRIG, HDL, LDL, LDLDIRECT  No results found for: TSH, W9ODVFE, X0JBNZV, THYROIDAB  No results found for: CKTOTAL, CKMB, CKMBINDEX, TROPONINI, TROPONINT  No results found for: HGBA1C  No results found for: DDIMER  Lab Results   Component Value Date    ALT 9 07/05/2019     No results found for: HGBA1C  Lab Results   Component Value Date    CREATININE 0.62 07/05/2019     No results found for: IRON, TIBC, FERRITIN  Lab Results   Component Value Date    INR 0.94 06/20/2019    PROTIME 12.4 06/20/2019       Assessment/Plan     1. Abnormal echocardiogram with LV EF of 49%.  Her TTE shows LVEF that is improved. Since she has low-normal LV EF she can return to the care of her PCP.  -Call for concerning symptoms   2. Cardiac Risk Assessment:Dyslipidemia/BP Goals//Diet/Current Weight/Tobacco status/Screening:   -Continue follow-up visits with PCP/OB  for monitoring of labs; Dietary changes: Increase soluble fiber: A printed copy of a low fat, low cholesterol diet was given; Reduce saturated fat, \"trans\" monounsaturated fatty acids, and cholesterol; Exercise changes:  Encouraged daily aerobic activity.  -HTN is a significant risk factor for stroke, heart disease and vascular disease. I've recommended the patient continue current medications, if any, as prescribed by the primary care provider. I recommended they have close follow-up for ongoing mgmt of this and the medical comorbidities associated with HTN with their PCP.  -HTN hand-out    General patient education regarding weight:   The patient's BMI is Body mass index is 31.03 kg/m²..  This places the patient in weight class:  Obese Class II: 35-39.9kg/m2.   -Weight loss hand-out  -Exercise intervention:   Hand out, increase aerobic activity  -Close PCP follow-up for Body mass index is 31.03 kg/m²..     Nicotine Status:   Nicotine status: has never smoked               Valente Brown is discharged from my care. No further follow-up in my office. " The patient is to return to the care of their PCP.
